# Patient Record
Sex: MALE | Race: OTHER
[De-identification: names, ages, dates, MRNs, and addresses within clinical notes are randomized per-mention and may not be internally consistent; named-entity substitution may affect disease eponyms.]

---

## 2017-06-13 ENCOUNTER — RECORD ABSTRACTING (OUTPATIENT)
Age: 62
End: 2017-06-13

## 2017-06-13 DIAGNOSIS — R79.89 OTHER SPECIFIED ABNORMAL FINDINGS OF BLOOD CHEMISTRY: ICD-10-CM

## 2017-06-13 DIAGNOSIS — R74.8 ABNORMAL LEVELS OF OTHER SERUM ENZYMES: ICD-10-CM

## 2017-06-13 DIAGNOSIS — Z87.898 PERSONAL HISTORY OF OTHER SPECIFIED CONDITIONS: ICD-10-CM

## 2017-06-13 DIAGNOSIS — Z00.8 ENCOUNTER FOR OTHER GENERAL EXAMINATION: ICD-10-CM

## 2017-06-13 DIAGNOSIS — F15.90 OTHER STIMULANT USE, UNSPECIFIED, UNCOMPLICATED: ICD-10-CM

## 2017-06-13 DIAGNOSIS — E66.3 OVERWEIGHT: ICD-10-CM

## 2017-06-13 DIAGNOSIS — Z87.891 PERSONAL HISTORY OF NICOTINE DEPENDENCE: ICD-10-CM

## 2017-06-13 DIAGNOSIS — Z86.39 PERSONAL HISTORY OF OTHER ENDOCRINE, NUTRITIONAL AND METABOLIC DISEASE: ICD-10-CM

## 2017-06-23 ENCOUNTER — APPOINTMENT (OUTPATIENT)
Dept: HEART AND VASCULAR | Facility: CLINIC | Age: 62
End: 2017-06-23

## 2017-06-23 VITALS
BODY MASS INDEX: 29.39 KG/M2 | OXYGEN SATURATION: 98 % | DIASTOLIC BLOOD PRESSURE: 84 MMHG | HEART RATE: 85 BPM | WEIGHT: 231.49 LBS | HEIGHT: 74.5 IN | SYSTOLIC BLOOD PRESSURE: 138 MMHG | TEMPERATURE: 99 F

## 2017-06-23 DIAGNOSIS — Z12.9 ENCOUNTER FOR SCREENING FOR MALIGNANT NEOPLASM, SITE UNSPECIFIED: ICD-10-CM

## 2017-06-23 RX ORDER — ESZOPICLONE 3 MG/1
3 TABLET, COATED ORAL
Refills: 0 | Status: ACTIVE | COMMUNITY

## 2017-06-26 LAB
ALBUMIN SERPL ELPH-MCNC: 4.6 G/DL
ALP BLD-CCNC: 50 U/L
ALT SERPL-CCNC: 13 U/L
ANION GAP SERPL CALC-SCNC: 16 MMOL/L
AST SERPL-CCNC: 23 U/L
BASOPHILS # BLD AUTO: 0.02 K/UL
BASOPHILS NFR BLD AUTO: 0.4 %
BILIRUB SERPL-MCNC: 0.5 MG/DL
BUN SERPL-MCNC: 10 MG/DL
CALCIUM SERPL-MCNC: 9.1 MG/DL
CHLORIDE SERPL-SCNC: 100 MMOL/L
CHOLEST SERPL-MCNC: 152 MG/DL
CHOLEST/HDLC SERPL: 3.2 RATIO
CO2 SERPL-SCNC: 25 MMOL/L
CREAT SERPL-MCNC: 0.91 MG/DL
EOSINOPHIL # BLD AUTO: 0.03 K/UL
EOSINOPHIL NFR BLD AUTO: 0.5 %
GLUCOSE SERPL-MCNC: 103 MG/DL
HBA1C MFR BLD HPLC: 5.5 %
HCT VFR BLD CALC: 47.3 %
HDLC SERPL-MCNC: 48 MG/DL
HGB BLD-MCNC: 15.5 G/DL
IMM GRANULOCYTES NFR BLD AUTO: 0 %
LDLC SERPL CALC-MCNC: 84 MG/DL
LYMPHOCYTES # BLD AUTO: 2.35 K/UL
LYMPHOCYTES NFR BLD AUTO: 42.4 %
MAN DIFF?: NORMAL
MCHC RBC-ENTMCNC: 32 PG
MCHC RBC-ENTMCNC: 32.8 GM/DL
MCV RBC AUTO: 97.7 FL
MONOCYTES # BLD AUTO: 0.44 K/UL
MONOCYTES NFR BLD AUTO: 7.9 %
NEUTROPHILS # BLD AUTO: 2.7 K/UL
NEUTROPHILS NFR BLD AUTO: 48.8 %
PLATELET # BLD AUTO: 194 K/UL
POTASSIUM SERPL-SCNC: 5.1 MMOL/L
PROT SERPL-MCNC: 7.5 G/DL
RBC # BLD: 4.84 M/UL
RBC # FLD: 13.3 %
SODIUM SERPL-SCNC: 141 MMOL/L
T3FREE SERPL-MCNC: 2.78 PG/ML
T4 FREE SERPL-MCNC: 1.2 NG/DL
TRIGL SERPL-MCNC: 98 MG/DL
TSH SERPL-ACNC: 1.59 UIU/ML
WBC # FLD AUTO: 5.54 K/UL

## 2017-11-06 ENCOUNTER — RX RENEWAL (OUTPATIENT)
Age: 62
End: 2017-11-06

## 2018-01-08 ENCOUNTER — APPOINTMENT (OUTPATIENT)
Dept: HEART AND VASCULAR | Facility: CLINIC | Age: 63
End: 2018-01-08
Payer: MEDICARE

## 2018-01-08 VITALS
BODY MASS INDEX: 31.02 KG/M2 | SYSTOLIC BLOOD PRESSURE: 132 MMHG | HEIGHT: 74.5 IN | TEMPERATURE: 98.7 F | OXYGEN SATURATION: 98 % | DIASTOLIC BLOOD PRESSURE: 80 MMHG | HEART RATE: 99 BPM | WEIGHT: 244.31 LBS

## 2018-01-08 PROCEDURE — 93000 ELECTROCARDIOGRAM COMPLETE: CPT

## 2018-01-08 PROCEDURE — 36415 COLL VENOUS BLD VENIPUNCTURE: CPT

## 2018-01-08 PROCEDURE — 99214 OFFICE O/P EST MOD 30 MIN: CPT | Mod: 25

## 2018-01-10 ENCOUNTER — APPOINTMENT (OUTPATIENT)
Dept: HEART AND VASCULAR | Facility: CLINIC | Age: 63
End: 2018-01-10
Payer: MEDICARE

## 2018-01-10 LAB
ALBUMIN SERPL ELPH-MCNC: 4.8 G/DL
ALP BLD-CCNC: 49 U/L
ALT SERPL-CCNC: 20 U/L
ANION GAP SERPL CALC-SCNC: 17 MMOL/L
AST SERPL-CCNC: 22 U/L
BASOPHILS # BLD AUTO: 0.02 K/UL
BASOPHILS NFR BLD AUTO: 0.3 %
BILIRUB SERPL-MCNC: 0.2 MG/DL
BUN SERPL-MCNC: 17 MG/DL
CALCIUM SERPL-MCNC: 9.6 MG/DL
CHLORIDE SERPL-SCNC: 100 MMOL/L
CHOLEST SERPL-MCNC: 175 MG/DL
CHOLEST/HDLC SERPL: 3.7 RATIO
CO2 SERPL-SCNC: 24 MMOL/L
CREAT SERPL-MCNC: 0.93 MG/DL
EOSINOPHIL # BLD AUTO: 0.07 K/UL
EOSINOPHIL NFR BLD AUTO: 1.1 %
GLUCOSE SERPL-MCNC: 115 MG/DL
HBA1C MFR BLD HPLC: 5.7 %
HCT VFR BLD CALC: 46.3 %
HCV AB SER QL: NONREACTIVE
HCV S/CO RATIO: 0.15 S/CO
HDLC SERPL-MCNC: 47 MG/DL
HGB BLD-MCNC: 15.9 G/DL
IMM GRANULOCYTES NFR BLD AUTO: 0 %
LDLC SERPL CALC-MCNC: 85 MG/DL
LYMPHOCYTES # BLD AUTO: 2.05 K/UL
LYMPHOCYTES NFR BLD AUTO: 33.6 %
MAN DIFF?: NORMAL
MCHC RBC-ENTMCNC: 33.3 PG
MCHC RBC-ENTMCNC: 34.3 GM/DL
MCV RBC AUTO: 96.9 FL
MONOCYTES # BLD AUTO: 0.71 K/UL
MONOCYTES NFR BLD AUTO: 11.6 %
NEUTROPHILS # BLD AUTO: 3.25 K/UL
NEUTROPHILS NFR BLD AUTO: 53.4 %
PLATELET # BLD AUTO: 187 K/UL
POTASSIUM SERPL-SCNC: 4.8 MMOL/L
PROT SERPL-MCNC: 7.4 G/DL
PSA SERPL-MCNC: 0.83 NG/ML
RBC # BLD: 4.78 M/UL
RBC # FLD: 12.9 %
SODIUM SERPL-SCNC: 141 MMOL/L
TRIGL SERPL-MCNC: 217 MG/DL
WBC # FLD AUTO: 6.1 K/UL

## 2018-01-10 PROCEDURE — ZZZZZ: CPT

## 2018-04-26 ENCOUNTER — RX RENEWAL (OUTPATIENT)
Age: 63
End: 2018-04-26

## 2018-05-01 ENCOUNTER — APPOINTMENT (OUTPATIENT)
Dept: HEART AND VASCULAR | Facility: CLINIC | Age: 63
End: 2018-05-01
Payer: MEDICARE

## 2018-05-01 VITALS
SYSTOLIC BLOOD PRESSURE: 140 MMHG | TEMPERATURE: 99 F | BODY MASS INDEX: 30.21 KG/M2 | HEART RATE: 96 BPM | OXYGEN SATURATION: 98 % | WEIGHT: 243 LBS | HEIGHT: 75 IN | DIASTOLIC BLOOD PRESSURE: 80 MMHG

## 2018-05-01 DIAGNOSIS — G40.909 EPILEPSY, UNSPECIFIED, NOT INTRACTABLE, W/OUT STATUS EPILEPTICUS: ICD-10-CM

## 2018-05-01 PROCEDURE — 99214 OFFICE O/P EST MOD 30 MIN: CPT

## 2018-07-10 ENCOUNTER — APPOINTMENT (OUTPATIENT)
Dept: HEART AND VASCULAR | Facility: CLINIC | Age: 63
End: 2018-07-10
Payer: MEDICARE

## 2018-07-10 ENCOUNTER — LABORATORY RESULT (OUTPATIENT)
Age: 63
End: 2018-07-10

## 2018-07-10 VITALS
TEMPERATURE: 97.9 F | BODY MASS INDEX: 30.46 KG/M2 | WEIGHT: 245 LBS | SYSTOLIC BLOOD PRESSURE: 120 MMHG | HEART RATE: 102 BPM | OXYGEN SATURATION: 97 % | HEIGHT: 75 IN | DIASTOLIC BLOOD PRESSURE: 80 MMHG

## 2018-07-10 DIAGNOSIS — Z12.9 ENCOUNTER FOR SCREENING FOR MALIGNANT NEOPLASM, SITE UNSPECIFIED: ICD-10-CM

## 2018-07-10 DIAGNOSIS — G47.00 INSOMNIA, UNSPECIFIED: ICD-10-CM

## 2018-07-10 DIAGNOSIS — Z87.09 PERSONAL HISTORY OF OTHER DISEASES OF THE RESPIRATORY SYSTEM: ICD-10-CM

## 2018-07-10 PROCEDURE — 36415 COLL VENOUS BLD VENIPUNCTURE: CPT

## 2018-07-10 PROCEDURE — 99214 OFFICE O/P EST MOD 30 MIN: CPT | Mod: 25

## 2018-07-10 PROCEDURE — 93000 ELECTROCARDIOGRAM COMPLETE: CPT

## 2018-07-11 LAB
ALBUMIN SERPL ELPH-MCNC: 4.7 G/DL
ALP BLD-CCNC: 50 U/L
ALT SERPL-CCNC: 22 U/L
ANION GAP SERPL CALC-SCNC: 14 MMOL/L
AST SERPL-CCNC: 23 U/L
BASOPHILS # BLD AUTO: 0.02 K/UL
BASOPHILS NFR BLD AUTO: 0.4 %
BILIRUB SERPL-MCNC: 0.3 MG/DL
BUN SERPL-MCNC: 16 MG/DL
CALCIUM SERPL-MCNC: 9.2 MG/DL
CHLORIDE SERPL-SCNC: 100 MMOL/L
CO2 SERPL-SCNC: 26 MMOL/L
CREAT SERPL-MCNC: 0.79 MG/DL
EOSINOPHIL # BLD AUTO: 0.07 K/UL
EOSINOPHIL NFR BLD AUTO: 1.4 %
GLUCOSE SERPL-MCNC: 104 MG/DL
HBA1C MFR BLD HPLC: 5.7 %
HCT VFR BLD CALC: 46.3 %
HGB BLD-MCNC: 15.5 G/DL
IMM GRANULOCYTES NFR BLD AUTO: 0.2 %
LYMPHOCYTES # BLD AUTO: 1.77 K/UL
LYMPHOCYTES NFR BLD AUTO: 34.4 %
MAN DIFF?: NORMAL
MCHC RBC-ENTMCNC: 31.8 PG
MCHC RBC-ENTMCNC: 33.5 GM/DL
MCV RBC AUTO: 94.9 FL
MONOCYTES # BLD AUTO: 0.66 K/UL
MONOCYTES NFR BLD AUTO: 12.8 %
NEUTROPHILS # BLD AUTO: 2.61 K/UL
NEUTROPHILS NFR BLD AUTO: 50.8 %
PLATELET # BLD AUTO: 179 K/UL
POTASSIUM SERPL-SCNC: 4.4 MMOL/L
PROT SERPL-MCNC: 7 G/DL
RBC # BLD: 4.88 M/UL
RBC # FLD: 13.1 %
SODIUM SERPL-SCNC: 139 MMOL/L
WBC # FLD AUTO: 5.14 K/UL

## 2018-07-22 PROBLEM — R79.89 LOW TESTOSTERONE: Status: RESOLVED | Noted: 2017-06-13 | Resolved: 2018-07-22

## 2018-11-28 ENCOUNTER — RX RENEWAL (OUTPATIENT)
Age: 63
End: 2018-11-28

## 2018-12-10 ENCOUNTER — APPOINTMENT (OUTPATIENT)
Dept: HEART AND VASCULAR | Facility: CLINIC | Age: 63
End: 2018-12-10
Payer: MEDICARE

## 2018-12-10 VITALS
HEART RATE: 115 BPM | SYSTOLIC BLOOD PRESSURE: 120 MMHG | TEMPERATURE: 97.3 F | BODY MASS INDEX: 28.47 KG/M2 | HEIGHT: 75 IN | WEIGHT: 228.99 LBS | DIASTOLIC BLOOD PRESSURE: 70 MMHG | OXYGEN SATURATION: 98 %

## 2018-12-10 PROCEDURE — 36415 COLL VENOUS BLD VENIPUNCTURE: CPT

## 2018-12-10 PROCEDURE — 93000 ELECTROCARDIOGRAM COMPLETE: CPT

## 2018-12-10 PROCEDURE — 99214 OFFICE O/P EST MOD 30 MIN: CPT

## 2018-12-10 NOTE — PHYSICAL EXAM
[General Appearance - Well Developed] : well developed [Normal Appearance] : normal appearance [Well Groomed] : well groomed [General Appearance - Well Nourished] : well nourished [No Deformities] : no deformities [General Appearance - In No Acute Distress] : no acute distress [Normal Conjunctiva] : the conjunctiva exhibited no abnormalities [Eyelids - No Xanthelasma] : the eyelids demonstrated no xanthelasmas [Normal Oral Mucosa] : normal oral mucosa [No Oral Pallor] : no oral pallor [No Oral Cyanosis] : no oral cyanosis [Normal Jugular Venous A Waves Present] : normal jugular venous A waves present [Normal Jugular Venous V Waves Present] : normal jugular venous V waves present [No Jugular Venous Santiago A Waves] : no jugular venous santiago A waves [Respiration, Rhythm And Depth] : normal respiratory rhythm and effort [Exaggerated Use Of Accessory Muscles For Inspiration] : no accessory muscle use [Auscultation Breath Sounds / Voice Sounds] : lungs were clear to auscultation bilaterally [Heart Rate And Rhythm] : heart rate and rhythm were normal [Heart Sounds] : normal S1 and S2 [Murmurs] : no murmurs present [Abdomen Soft] : soft [Abdomen Tenderness] : non-tender [Abdomen Mass (___ Cm)] : no abdominal mass palpated [Abnormal Walk] : normal gait [Gait - Sufficient For Exercise Testing] : the gait was sufficient for exercise testing [Nail Clubbing] : no clubbing of the fingernails [Cyanosis, Localized] : no localized cyanosis [Petechial Hemorrhages (___cm)] : no petechial hemorrhages [Skin Color & Pigmentation] : normal skin color and pigmentation [] : no rash [No Venous Stasis] : no venous stasis [Skin Lesions] : no skin lesions [No Skin Ulcers] : no skin ulcer [No Xanthoma] : no  xanthoma was observed [Oriented To Time, Place, And Person] : oriented to person, place, and time [Mood] : the mood was normal [FreeTextEntry1] : pressured speech

## 2018-12-10 NOTE — ASSESSMENT
[FreeTextEntry1] : Fatigue-not a new c/o, will check labs\par \par AC- complete exam including JONG and labs done.  EKG NL.  PPD placed right forearm in 1/2018 negative\par \par BPH-Cialis RENEWED, Pt needs a non-formulary exception(BPH, failed other meds, Avodart, Flomax, Uroxatrol).  Did see Urology Dr Berger.[facility] Rx [employer] MemberID [id number], RxBin [id number], RxPCN 9999, RxGrp [id number], Providers [phone]\par \par Prediabetes-pt has lost weight, exercising.  Refuses flu shot 12/10/18 BECAUSE HIS SISTER TOLD HIM IT WAS DANGEROUS.\par \par Schizoaffective disorder-Pt disagrees with this diagnosis. Pt says he has insomnia and anx/depr.   Dr Borden. Over 30 min spent with pt.\par CANCER SCREENING-never had colonoscopy, he agrees to it, will order. Pt did not have it yet. JONG and PSA done 1/8/18, colonoscopy discussed again 12/10/18!!.  \par \par

## 2018-12-10 NOTE — REASON FOR VISIT
[Follow-Up - Clinic] : a clinic follow-up of [FreeTextEntry1] : PT here for f/u of multiple medical problems. h/o elevated LFTs, prediabetes, BPH, review of cancer screening, etc  He denies that he has Schizoaffective disorder. \par Pt takes Cialis, has tried Tadafinil but results were not as good.  Has BPH and ED from meds.  Saw a Urologist @ [facility] and [facility], as per pt he was told to go to 10mg.  Pt reports mild incontinence, some nocturia.\par \par \par EKG: NSR, normal axis and intervals, no ST-Tw abnormalities. 12/10/18\par \par Refuses flu shot

## 2018-12-11 LAB
ALBUMIN SERPL ELPH-MCNC: 4.9 G/DL
ALP BLD-CCNC: 56 U/L
ALT SERPL-CCNC: 17 U/L
ANION GAP SERPL CALC-SCNC: 15 MMOL/L
AST SERPL-CCNC: 22 U/L
BASOPHILS # BLD AUTO: 0.02 K/UL
BASOPHILS NFR BLD AUTO: 0.3 %
BILIRUB SERPL-MCNC: 0.2 MG/DL
BUN SERPL-MCNC: 13 MG/DL
CALCIUM SERPL-MCNC: 9.8 MG/DL
CHLORIDE SERPL-SCNC: 100 MMOL/L
CHOLEST SERPL-MCNC: 159 MG/DL
CHOLEST/HDLC SERPL: 4 RATIO
CO2 SERPL-SCNC: 24 MMOL/L
CREAT SERPL-MCNC: 0.86 MG/DL
EOSINOPHIL # BLD AUTO: 0.05 K/UL
EOSINOPHIL NFR BLD AUTO: 0.9 %
GLUCOSE SERPL-MCNC: 118 MG/DL
HBA1C MFR BLD HPLC: 5.8 %
HCT VFR BLD CALC: 48.7 %
HDLC SERPL-MCNC: 40 MG/DL
HGB BLD-MCNC: 15.9 G/DL
IMM GRANULOCYTES NFR BLD AUTO: 0.2 %
LDLC SERPL CALC-MCNC: 82 MG/DL
LYMPHOCYTES # BLD AUTO: 1.36 K/UL
LYMPHOCYTES NFR BLD AUTO: 23.2 %
MAN DIFF?: NORMAL
MCHC RBC-ENTMCNC: 32.1 PG
MCHC RBC-ENTMCNC: 32.6 GM/DL
MCV RBC AUTO: 98.2 FL
MONOCYTES # BLD AUTO: 0.48 K/UL
MONOCYTES NFR BLD AUTO: 8.2 %
NEUTROPHILS # BLD AUTO: 3.93 K/UL
NEUTROPHILS NFR BLD AUTO: 67.2 %
PLATELET # BLD AUTO: 203 K/UL
POTASSIUM SERPL-SCNC: 4.8 MMOL/L
PROLACTIN SERPL-MCNC: 4 NG/ML
PROT SERPL-MCNC: 7.1 G/DL
RBC # BLD: 4.96 M/UL
RBC # FLD: 13.4 %
SODIUM SERPL-SCNC: 139 MMOL/L
TRIGL SERPL-MCNC: 183 MG/DL
VALPROATE SERPL-MCNC: 54 UG/ML
WBC # FLD AUTO: 5.85 K/UL

## 2019-01-08 ENCOUNTER — APPOINTMENT (OUTPATIENT)
Dept: HEART AND VASCULAR | Facility: CLINIC | Age: 64
End: 2019-01-08
Payer: MEDICARE

## 2019-01-08 VITALS
HEIGHT: 75 IN | TEMPERATURE: 98.3 F | OXYGEN SATURATION: 98 % | DIASTOLIC BLOOD PRESSURE: 88 MMHG | WEIGHT: 231 LBS | SYSTOLIC BLOOD PRESSURE: 140 MMHG | HEART RATE: 94 BPM | BODY MASS INDEX: 28.72 KG/M2

## 2019-01-08 PROCEDURE — 99214 OFFICE O/P EST MOD 30 MIN: CPT

## 2019-01-08 NOTE — ASSESSMENT
[FreeTextEntry1] : Fatigue-not a new c/o, will check labs\par \par HCM- complete exam including JONG and labs done.  EKG NL.  PPD placed right forearm in 1/2018 negative\par \par BPH-Cialis RENEWED, Pt needs a non-formulary exception(BPH, failed other meds, Avodart, Flomax, Uroxatrol).  Did see Urology Dr Berger.[facility] Rx [employer] MemberID [id number], RxBin [id number], RxPCN 9999, RxGrp [id number], Providers [phone]\par \par HTN- will follow BP, is losing weight.\par \par Prediabetes-pt has lost weight, exercising.  Refuses flu shot 12/10/18 BECAUSE HIS SISTER TOLD HIM IT WAS DANGEROUS.  Losing weight.\par \par Schizoaffective disorder-Pt disagrees with this diagnosis. Pt says he has insomnia and anx/depr.   Dr Borden. Over 30 min spent with pt.\par CANCER SCREENING-never had colonoscopy, he agrees to it, will order. Pt did not have it yet. JONG and PSA done 1/8/18, colonoscopy discussed again 12/10/18!!.  \par \par

## 2019-01-08 NOTE — REASON FOR VISIT
[Follow-Up - Clinic] : a clinic follow-up of [FreeTextEntry1] : PT here for f/u of multiple medical problems. h/o elevated LFTs, prediabetes, BPH, review of cancer screening, etc  He denies that he has Schizoaffective disorder. \par Pt takes Cialis, has tried Tadafinil but results were not as good.  Has BPH and ED from meds.  Saw a Urologist @ [facility] and [facility], as per pt he was told to go to 10mg.  Pt reports mild incontinence, some nocturia.\par \par \par EKG: NSR, normal axis and intervals, no ST-Tw abnormalities. 12/10/18\par \par Refuses flu shot, pt here 1/8/19 insisting I call his plan to get Cialis 5mg, i did this but was disconnected after 10min.

## 2019-04-09 ENCOUNTER — APPOINTMENT (OUTPATIENT)
Dept: HEART AND VASCULAR | Facility: CLINIC | Age: 64
End: 2019-04-09
Payer: MEDICARE

## 2019-04-09 VITALS
WEIGHT: 228 LBS | BODY MASS INDEX: 28.35 KG/M2 | RESPIRATION RATE: 14 BRPM | OXYGEN SATURATION: 97 % | HEART RATE: 95 BPM | TEMPERATURE: 97.7 F | HEIGHT: 75 IN | DIASTOLIC BLOOD PRESSURE: 82 MMHG | SYSTOLIC BLOOD PRESSURE: 121 MMHG

## 2019-04-09 PROCEDURE — 36415 COLL VENOUS BLD VENIPUNCTURE: CPT

## 2019-04-09 PROCEDURE — 93000 ELECTROCARDIOGRAM COMPLETE: CPT

## 2019-04-09 PROCEDURE — 99214 OFFICE O/P EST MOD 30 MIN: CPT

## 2019-04-09 NOTE — REASON FOR VISIT
[Follow-Up - Clinic] : a clinic follow-up of [FreeTextEntry1] : PT here for f/u of multiple medical problems. h/o elevated LFTs, prediabetes, BPH, review of cancer screening, etc  He denies that he has Schizoaffective disorder. \par Pt takes Cialis, has tried Tadafinil but results were not as good.  Has BPH and ED from meds.  Saw a Urologist @ [facility] and [facility], as per pt he was told to go to 10mg.  Pt reports mild incontinence, some nocturia.\par \par \par EKG: NSR, normal axis and intervals, no ST-Tw abnormalities.4/9/19\par \par Refuses flu shot, pt here 1/8/19 insisting I call his plan to get Cialis 5mg, i did this but was disconnected after 10min.

## 2019-04-09 NOTE — PHYSICAL EXAM
[General Appearance - Well Developed] : well developed [Normal Appearance] : normal appearance [Well Groomed] : well groomed [General Appearance - Well Nourished] : well nourished [No Deformities] : no deformities [General Appearance - In No Acute Distress] : no acute distress [Normal Conjunctiva] : the conjunctiva exhibited no abnormalities [Eyelids - No Xanthelasma] : the eyelids demonstrated no xanthelasmas [Normal Oral Mucosa] : normal oral mucosa [No Oral Cyanosis] : no oral cyanosis [No Oral Pallor] : no oral pallor [Normal Jugular Venous A Waves Present] : normal jugular venous A waves present [Normal Jugular Venous V Waves Present] : normal jugular venous V waves present [No Jugular Venous Santiago A Waves] : no jugular venous santiago A waves [Exaggerated Use Of Accessory Muscles For Inspiration] : no accessory muscle use [Respiration, Rhythm And Depth] : normal respiratory rhythm and effort [Heart Rate And Rhythm] : heart rate and rhythm were normal [Auscultation Breath Sounds / Voice Sounds] : lungs were clear to auscultation bilaterally [Heart Sounds] : normal S1 and S2 [Murmurs] : no murmurs present [Abdomen Tenderness] : non-tender [Abdomen Soft] : soft [Abnormal Walk] : normal gait [Abdomen Mass (___ Cm)] : no abdominal mass palpated [Gait - Sufficient For Exercise Testing] : the gait was sufficient for exercise testing [Nail Clubbing] : no clubbing of the fingernails [Cyanosis, Localized] : no localized cyanosis [Petechial Hemorrhages (___cm)] : no petechial hemorrhages [Skin Color & Pigmentation] : normal skin color and pigmentation [] : no rash [No Venous Stasis] : no venous stasis [Skin Lesions] : no skin lesions [No Xanthoma] : no  xanthoma was observed [No Skin Ulcers] : no skin ulcer [Oriented To Time, Place, And Person] : oriented to person, place, and time [Mood] : the mood was normal [FreeTextEntry1] : pressured speech

## 2019-04-09 NOTE — ASSESSMENT
[FreeTextEntry1] : Fatigue-not a new c/o, will update  labs \par \par Health Maintainence- complete exam including JONG and labs done.  EKG NL.  PPD placed right forearm in 1/2018 negative\par \par BPH-Cialis RENEWED, Pt needs a non-formulary exception(BPH, failed other meds, Avodart, Flomax, Uroxatrol).  Did see Urology Dr Berger.[facility] Rx [employer] MemberID [id number], RxBin [id number], RxPCN 9999, RxGrp [id number], Providers [phone]\par \par HTN- will follow BP, is losing weight.\par \par Prediabetes-pt has lost weight, exercising.  Refuses flu shot 12/10/18 BECAUSE HIS SISTER TOLD HIM IT WAS DANGEROUS.  Losing weight nicely..\par \par Schizoaffective disorder-Pt disagrees with this diagnosis. Pt says he has insomnia and anx/depr.   Dr Borden. Over 30 min spent with pt.\par \par CANCER SCREENING-never had colonoscopy, he agrees to it, will order. Pt did not have it yet. JONG and PSA done 1/8/18, colonoscopy discussed again 12/10/18!!.  4/9/19.\par \par 
independent

## 2019-04-10 LAB
24R-OH-CALCIDIOL SERPL-MCNC: 33.6 PG/ML
ALBUMIN SERPL ELPH-MCNC: 5.2 G/DL
ALP BLD-CCNC: 52 U/L
ALT SERPL-CCNC: 21 U/L
ANION GAP SERPL CALC-SCNC: 15 MMOL/L
AST SERPL-CCNC: 25 U/L
BASOPHILS # BLD AUTO: 0.03 K/UL
BASOPHILS NFR BLD AUTO: 0.5 %
BILIRUB SERPL-MCNC: 0.4 MG/DL
BUN SERPL-MCNC: 13 MG/DL
CALCIUM SERPL-MCNC: 9.7 MG/DL
CHLORIDE SERPL-SCNC: 100 MMOL/L
CHOLEST SERPL-MCNC: 171 MG/DL
CHOLEST/HDLC SERPL: 4 RATIO
CO2 SERPL-SCNC: 26 MMOL/L
CREAT SERPL-MCNC: 0.87 MG/DL
EOSINOPHIL # BLD AUTO: 0.07 K/UL
EOSINOPHIL NFR BLD AUTO: 1.2 %
ESTIMATED AVERAGE GLUCOSE: 114 MG/DL
GLUCOSE SERPL-MCNC: 111 MG/DL
HBA1C MFR BLD HPLC: 5.6 %
HCT VFR BLD CALC: 50.3 %
HDLC SERPL-MCNC: 43 MG/DL
HGB BLD-MCNC: 17 G/DL
IMM GRANULOCYTES NFR BLD AUTO: 0.2 %
LDLC SERPL CALC-MCNC: 99 MG/DL
LYMPHOCYTES # BLD AUTO: 2.07 K/UL
LYMPHOCYTES NFR BLD AUTO: 36.3 %
MAN DIFF?: NORMAL
MCHC RBC-ENTMCNC: 32.3 PG
MCHC RBC-ENTMCNC: 33.8 GM/DL
MCV RBC AUTO: 95.4 FL
MONOCYTES # BLD AUTO: 0.54 K/UL
MONOCYTES NFR BLD AUTO: 9.5 %
NEUTROPHILS # BLD AUTO: 2.98 K/UL
NEUTROPHILS NFR BLD AUTO: 52.3 %
PLATELET # BLD AUTO: 189 K/UL
POTASSIUM SERPL-SCNC: 4.6 MMOL/L
PROLACTIN SERPL-MCNC: 4.8 NG/ML
PROT SERPL-MCNC: 7.6 G/DL
PSA SERPL-MCNC: 1.18 NG/ML
RBC # BLD: 5.27 M/UL
RBC # FLD: 12.7 %
SODIUM SERPL-SCNC: 141 MMOL/L
TESTOST SERPL-MCNC: 331 NG/DL
TRIGL SERPL-MCNC: 145 MG/DL
VALPROATE SERPL-MCNC: 69 UG/ML
WBC # FLD AUTO: 5.7 K/UL

## 2019-09-09 ENCOUNTER — APPOINTMENT (OUTPATIENT)
Dept: HEART AND VASCULAR | Facility: CLINIC | Age: 64
End: 2019-09-09
Payer: MEDICARE

## 2019-09-09 VITALS
HEART RATE: 99 BPM | OXYGEN SATURATION: 98 % | WEIGHT: 228.99 LBS | DIASTOLIC BLOOD PRESSURE: 80 MMHG | HEIGHT: 75 IN | TEMPERATURE: 98.5 F | SYSTOLIC BLOOD PRESSURE: 122 MMHG | BODY MASS INDEX: 28.47 KG/M2

## 2019-09-09 DIAGNOSIS — Z83.3 FAMILY HISTORY OF DIABETES MELLITUS: ICD-10-CM

## 2019-09-09 PROCEDURE — 99214 OFFICE O/P EST MOD 30 MIN: CPT

## 2019-09-09 PROCEDURE — 36415 COLL VENOUS BLD VENIPUNCTURE: CPT

## 2019-09-09 NOTE — PHYSICAL EXAM
[General Appearance - Well Developed] : well developed [Normal Appearance] : normal appearance [Well Groomed] : well groomed [No Deformities] : no deformities [General Appearance - Well Nourished] : well nourished [General Appearance - In No Acute Distress] : no acute distress [Normal Conjunctiva] : the conjunctiva exhibited no abnormalities [Eyelids - No Xanthelasma] : the eyelids demonstrated no xanthelasmas [Normal Oral Mucosa] : normal oral mucosa [No Oral Pallor] : no oral pallor [No Oral Cyanosis] : no oral cyanosis [Normal Jugular Venous A Waves Present] : normal jugular venous A waves present [Normal Jugular Venous V Waves Present] : normal jugular venous V waves present [No Jugular Venous Santiago A Waves] : no jugular venous santiago A waves [Respiration, Rhythm And Depth] : normal respiratory rhythm and effort [Auscultation Breath Sounds / Voice Sounds] : lungs were clear to auscultation bilaterally [Exaggerated Use Of Accessory Muscles For Inspiration] : no accessory muscle use [Heart Rate And Rhythm] : heart rate and rhythm were normal [Heart Sounds] : normal S1 and S2 [Abdomen Soft] : soft [Murmurs] : no murmurs present [Abdomen Tenderness] : non-tender [Abdomen Mass (___ Cm)] : no abdominal mass palpated [Abnormal Walk] : normal gait [Gait - Sufficient For Exercise Testing] : the gait was sufficient for exercise testing [Nail Clubbing] : no clubbing of the fingernails [Cyanosis, Localized] : no localized cyanosis [Petechial Hemorrhages (___cm)] : no petechial hemorrhages [Skin Color & Pigmentation] : normal skin color and pigmentation [] : no rash [No Venous Stasis] : no venous stasis [Skin Lesions] : no skin lesions [No Skin Ulcers] : no skin ulcer [No Xanthoma] : no  xanthoma was observed [Oriented To Time, Place, And Person] : oriented to person, place, and time [Mood] : the mood was normal [FreeTextEntry1] : pressured speech

## 2019-09-09 NOTE — ASSESSMENT
Last appt - 6/26/2018    Future Appointments  Date Time Provider Tawanna Sawyer   9/27/2018 11:30 AM DO TAVON Carlos [FreeTextEntry1] : Fatigue-not a new c/o, will update  labs \par \par Health Maintainence- complete exam including JONG and labs done.  EKG NL.  PPD placed right forearm in 1/2018 negative\par \par BPH-Cialis RENEWED, Pt needs a non-formulary exception(BPH, failed other meds, Avodart, Flomax, Uroxatrol).  Did see Urology Dr Berger.[facility] Rx [employer] MemberID [id number], RxBin [id number], RxPCN 9999, RxGrp PDPLCE1, Providers [phone]\par \par HTN- will follow BP, is losing weight.\par \par Prediabetes-pt has lost weight, exercising.  Refuses flu shot 12/10/18 BECAUSE HIS SISTER TOLD HIM IT WAS DANGEROUS.  Losing weight nicely..\par \par Schizoaffective disorder-Pt disagrees with this diagnosis. Pt says he has insomnia and anx/depr.   Dr Borden. Over 30 min spent with pt.\par \par CANCER SCREENING-never had colonoscopy, he agrees to it, will order. Pt did not have it yet. JONG and PSA done 1/8/18, colonoscopy discussed again 12/10/18!!.  4/9/19.\par \par DJD- knees, 2 prior surgeries.

## 2019-09-09 NOTE — REVIEW OF SYSTEMS
[Urinary Frequency] : urinary frequency [Impotence] : impotence [Negative] : Heme/Lymph [Joint Pain] : joint pain

## 2019-09-09 NOTE — REASON FOR VISIT
[Follow-Up - Clinic] : a clinic follow-up of [FreeTextEntry1] : PT here for f/u of multiple medical problems. h/o elevated LFTs, prediabetes, BPH, review of cancer screening, etc  He denies that he has Schizoaffective disorder. \par Pt takes Cialis, has tried Tadafinil but results were not as good.  Has BPH and ED from meds.  Saw a Urologist @ [facility] and [facility], as per pt he was told to go to 10mg.  Pt reports mild incontinence, some nocturia.\par \par \par EKG: NSR, normal axis and intervals, no ST-Tw abnormalities.4/9/19\par \par Refuses flu shot, pt here 1/8/19 insisting I call his plan to get Cialis 5mg, i did this but was disconnected after 10min.

## 2019-09-10 LAB
ALBUMIN SERPL ELPH-MCNC: 4.9 G/DL
ALP BLD-CCNC: 51 U/L
ALT SERPL-CCNC: 16 U/L
ANION GAP SERPL CALC-SCNC: 15 MMOL/L
AST SERPL-CCNC: 20 U/L
BASOPHILS # BLD AUTO: 0.03 K/UL
BASOPHILS NFR BLD AUTO: 0.6 %
BILIRUB SERPL-MCNC: 0.2 MG/DL
BUN SERPL-MCNC: 14 MG/DL
CALCIUM SERPL-MCNC: 9.4 MG/DL
CHLORIDE SERPL-SCNC: 100 MMOL/L
CHOLEST SERPL-MCNC: 139 MG/DL
CHOLEST/HDLC SERPL: 3.2 RATIO
CO2 SERPL-SCNC: 23 MMOL/L
CREAT SERPL-MCNC: 0.72 MG/DL
EOSINOPHIL # BLD AUTO: 0.06 K/UL
EOSINOPHIL NFR BLD AUTO: 1.2 %
ESTIMATED AVERAGE GLUCOSE: 114 MG/DL
GLUCOSE SERPL-MCNC: 107 MG/DL
HBA1C MFR BLD HPLC: 5.6 %
HCT VFR BLD CALC: 47.2 %
HDLC SERPL-MCNC: 43 MG/DL
HGB BLD-MCNC: 15.4 G/DL
IMM GRANULOCYTES NFR BLD AUTO: 0.4 %
LDLC SERPL CALC-MCNC: 78 MG/DL
LYMPHOCYTES # BLD AUTO: 1.62 K/UL
LYMPHOCYTES NFR BLD AUTO: 31.6 %
MAN DIFF?: NORMAL
MCHC RBC-ENTMCNC: 31.8 PG
MCHC RBC-ENTMCNC: 32.6 GM/DL
MCV RBC AUTO: 97.3 FL
MONOCYTES # BLD AUTO: 0.42 K/UL
MONOCYTES NFR BLD AUTO: 8.2 %
NEUTROPHILS # BLD AUTO: 2.98 K/UL
NEUTROPHILS NFR BLD AUTO: 58 %
PLATELET # BLD AUTO: 174 K/UL
POTASSIUM SERPL-SCNC: 4.6 MMOL/L
PROLACTIN SERPL-MCNC: 4.3 NG/ML
PROT SERPL-MCNC: 7.1 G/DL
RBC # BLD: 4.85 M/UL
RBC # FLD: 12.5 %
SODIUM SERPL-SCNC: 138 MMOL/L
TRIGL SERPL-MCNC: 92 MG/DL
VALPROATE SERPL-MCNC: 81 UG/ML
WBC # FLD AUTO: 5.13 K/UL

## 2019-12-17 ENCOUNTER — APPOINTMENT (OUTPATIENT)
Dept: HEART AND VASCULAR | Facility: CLINIC | Age: 64
End: 2019-12-17
Payer: MEDICARE

## 2019-12-17 ENCOUNTER — NON-APPOINTMENT (OUTPATIENT)
Age: 64
End: 2019-12-17

## 2019-12-17 VITALS
HEART RATE: 89 BPM | WEIGHT: 230 LBS | HEIGHT: 75 IN | OXYGEN SATURATION: 98 % | DIASTOLIC BLOOD PRESSURE: 76 MMHG | SYSTOLIC BLOOD PRESSURE: 124 MMHG | BODY MASS INDEX: 28.6 KG/M2

## 2019-12-17 PROCEDURE — 99214 OFFICE O/P EST MOD 30 MIN: CPT

## 2019-12-17 PROCEDURE — 36415 COLL VENOUS BLD VENIPUNCTURE: CPT

## 2019-12-17 NOTE — REASON FOR VISIT
[Follow-Up - Clinic] : a clinic follow-up of [FreeTextEntry1] : PT here for f/u of multiple medical problems. h/o elevated LFTs, prediabetes, BPH, review of cancer screening, etc  He denies that he has Schizoaffective disorder. \par Pt takes Cialis, has tried Tadafinil but results were not as good.  Has BPH and ED from meds.  Saw a Urologist @ [facility] and [facility], as per pt he was told to go to 10mg.  Pt reports mild incontinence, some nocturia.\par \par \par EKG: NSR, normal axis and intervals, no ST-Tw abnormalities.4/9/19\par \par Refuses flu shot, pt here 1/8/19 insisting I call his plan to get Cialis 5mg, i did this but was disconnected after 10min. \par 12/17/19 Dx with major depression by Dr Borden

## 2019-12-17 NOTE — REVIEW OF SYSTEMS
[Impotence] : impotence [Urinary Frequency] : urinary frequency [Joint Pain] : joint pain [Negative] : Heme/Lymph

## 2019-12-17 NOTE — PHYSICAL EXAM
[General Appearance - Well Developed] : well developed [Normal Appearance] : normal appearance [General Appearance - Well Nourished] : well nourished [Well Groomed] : well groomed [No Deformities] : no deformities [General Appearance - In No Acute Distress] : no acute distress [Normal Conjunctiva] : the conjunctiva exhibited no abnormalities [Normal Oral Mucosa] : normal oral mucosa [Eyelids - No Xanthelasma] : the eyelids demonstrated no xanthelasmas [No Oral Cyanosis] : no oral cyanosis [No Oral Pallor] : no oral pallor [Normal Jugular Venous A Waves Present] : normal jugular venous A waves present [Normal Jugular Venous V Waves Present] : normal jugular venous V waves present [No Jugular Venous Santiago A Waves] : no jugular venous santiago A waves [Exaggerated Use Of Accessory Muscles For Inspiration] : no accessory muscle use [Respiration, Rhythm And Depth] : normal respiratory rhythm and effort [Heart Sounds] : normal S1 and S2 [Auscultation Breath Sounds / Voice Sounds] : lungs were clear to auscultation bilaterally [Heart Rate And Rhythm] : heart rate and rhythm were normal [Murmurs] : no murmurs present [Abdomen Soft] : soft [Abdomen Tenderness] : non-tender [Abdomen Mass (___ Cm)] : no abdominal mass palpated [Abnormal Walk] : normal gait [Gait - Sufficient For Exercise Testing] : the gait was sufficient for exercise testing [Nail Clubbing] : no clubbing of the fingernails [Cyanosis, Localized] : no localized cyanosis [Petechial Hemorrhages (___cm)] : no petechial hemorrhages [Skin Color & Pigmentation] : normal skin color and pigmentation [] : no rash [No Venous Stasis] : no venous stasis [Skin Lesions] : no skin lesions [No Xanthoma] : no  xanthoma was observed [No Skin Ulcers] : no skin ulcer [Mood] : the mood was normal [Oriented To Time, Place, And Person] : oriented to person, place, and time [FreeTextEntry1] : pressured speech

## 2019-12-17 NOTE — ASSESSMENT
[FreeTextEntry1] : Fatigue-not a new c/o, previously updated labs\par \par Health Maintainence- complete exam including JONG and labs done 1/8/18.  EKG NL.  PPD placed right forearm in 1/2018 negative. Labs updated 12/17/19\par \par BPH-Cialis(Brand) 5 mg RENEWED, Pt needs a non-formulary exception(BPH, failed other meds, Avodart, Flomax, Uroxatrol).  Did see Urology Dr Berger.[facility] Rx [employer] MemberID [id number], RxBin [id number], RxPCN 9999, RxGrp PDPLCE1, Providers [phone]\par \par HTN- will follow BP, is losing weight.\par \par Prediabetes- pt has lost weight, exercising.  Refuses flu shot 12/10/18 BECAUSE HIS SISTER TOLD HIM IT WAS DANGEROUS.  Losing weight nicely. Recent slight gain.\par \par Schizoaffective disorder- Pt disagrees with this diagnosis. Pt says he has insomnia and anx/depr.   Dr Borden. Over 30 min spent with pt.\par \par CANCER SCREENING-never had colonoscopy, he agrees to it, will order. Pt did not have it yet. JONG and PSA done 1/8/18, colonoscopy discussed again 12/10/18!!.  4/9/19.\par \par DJD- knees, 2 prior surgeries. Also has back pain.  Having trouble with ADLs, says he needs help with cleaning and shopping so M11Q filled out.

## 2019-12-18 LAB
ALBUMIN SERPL ELPH-MCNC: 4.9 G/DL
ALP BLD-CCNC: 46 U/L
ALT SERPL-CCNC: 14 U/L
ANION GAP SERPL CALC-SCNC: 12 MMOL/L
AST SERPL-CCNC: 19 U/L
BASOPHILS # BLD AUTO: 0.04 K/UL
BASOPHILS NFR BLD AUTO: 0.9 %
BILIRUB SERPL-MCNC: 0.3 MG/DL
BUN SERPL-MCNC: 18 MG/DL
CALCIUM SERPL-MCNC: 9.7 MG/DL
CHLORIDE SERPL-SCNC: 101 MMOL/L
CHOLEST SERPL-MCNC: 170 MG/DL
CHOLEST/HDLC SERPL: 3.6 RATIO
CO2 SERPL-SCNC: 27 MMOL/L
CREAT SERPL-MCNC: 0.8 MG/DL
EOSINOPHIL # BLD AUTO: 0.08 K/UL
EOSINOPHIL NFR BLD AUTO: 1.7 %
ESTIMATED AVERAGE GLUCOSE: 114 MG/DL
GLUCOSE SERPL-MCNC: 116 MG/DL
HBA1C MFR BLD HPLC: 5.6 %
HCT VFR BLD CALC: 48 %
HDLC SERPL-MCNC: 47 MG/DL
HGB BLD-MCNC: 15.8 G/DL
IMM GRANULOCYTES NFR BLD AUTO: 0.2 %
LDLC SERPL CALC-MCNC: 110 MG/DL
LYMPHOCYTES # BLD AUTO: 1.69 K/UL
LYMPHOCYTES NFR BLD AUTO: 36.8 %
MAN DIFF?: NORMAL
MCHC RBC-ENTMCNC: 31.8 PG
MCHC RBC-ENTMCNC: 32.9 GM/DL
MCV RBC AUTO: 96.6 FL
MONOCYTES # BLD AUTO: 0.43 K/UL
MONOCYTES NFR BLD AUTO: 9.4 %
NEUTROPHILS # BLD AUTO: 2.34 K/UL
NEUTROPHILS NFR BLD AUTO: 51 %
PLATELET # BLD AUTO: 188 K/UL
POTASSIUM SERPL-SCNC: 4.7 MMOL/L
PROLACTIN SERPL-MCNC: 4.4 NG/ML
PROT SERPL-MCNC: 7.3 G/DL
RBC # BLD: 4.97 M/UL
RBC # FLD: 12.8 %
SODIUM SERPL-SCNC: 140 MMOL/L
TRIGL SERPL-MCNC: 63 MG/DL
VALPROATE SERPL-MCNC: 69 UG/ML
WBC # FLD AUTO: 4.59 K/UL

## 2020-05-28 ENCOUNTER — NON-APPOINTMENT (OUTPATIENT)
Age: 65
End: 2020-05-28

## 2020-05-28 ENCOUNTER — APPOINTMENT (OUTPATIENT)
Dept: HEART AND VASCULAR | Facility: CLINIC | Age: 65
End: 2020-05-28
Payer: MEDICARE

## 2020-05-28 VITALS
DIASTOLIC BLOOD PRESSURE: 90 MMHG | BODY MASS INDEX: 28.6 KG/M2 | OXYGEN SATURATION: 98 % | WEIGHT: 229.99 LBS | HEART RATE: 75 BPM | SYSTOLIC BLOOD PRESSURE: 120 MMHG | HEIGHT: 75 IN | TEMPERATURE: 98 F

## 2020-05-28 PROCEDURE — 99214 OFFICE O/P EST MOD 30 MIN: CPT

## 2020-05-28 PROCEDURE — 93000 ELECTROCARDIOGRAM COMPLETE: CPT

## 2020-05-28 PROCEDURE — 36415 COLL VENOUS BLD VENIPUNCTURE: CPT

## 2020-05-28 NOTE — PHYSICAL EXAM
[General Appearance - Well Developed] : well developed [Well Groomed] : well groomed [Normal Appearance] : normal appearance [No Deformities] : no deformities [General Appearance - In No Acute Distress] : no acute distress [General Appearance - Well Nourished] : well nourished [Normal Conjunctiva] : the conjunctiva exhibited no abnormalities [Normal Oral Mucosa] : normal oral mucosa [Eyelids - No Xanthelasma] : the eyelids demonstrated no xanthelasmas [No Oral Cyanosis] : no oral cyanosis [No Oral Pallor] : no oral pallor [Normal Jugular Venous V Waves Present] : normal jugular venous V waves present [Normal Jugular Venous A Waves Present] : normal jugular venous A waves present [Respiration, Rhythm And Depth] : normal respiratory rhythm and effort [No Jugular Venous Santiago A Waves] : no jugular venous santiago A waves [Exaggerated Use Of Accessory Muscles For Inspiration] : no accessory muscle use [Auscultation Breath Sounds / Voice Sounds] : lungs were clear to auscultation bilaterally [Heart Rate And Rhythm] : heart rate and rhythm were normal [Heart Sounds] : normal S1 and S2 [Abdomen Soft] : soft [Murmurs] : no murmurs present [Abdomen Tenderness] : non-tender [Abnormal Walk] : normal gait [Abdomen Mass (___ Cm)] : no abdominal mass palpated [Gait - Sufficient For Exercise Testing] : the gait was sufficient for exercise testing [Nail Clubbing] : no clubbing of the fingernails [Petechial Hemorrhages (___cm)] : no petechial hemorrhages [Cyanosis, Localized] : no localized cyanosis [Skin Color & Pigmentation] : normal skin color and pigmentation [] : no ischemic changes [No Venous Stasis] : no venous stasis [No Xanthoma] : no  xanthoma was observed [No Skin Ulcers] : no skin ulcer [Skin Lesions] : no skin lesions [Mood] : the mood was normal [Oriented To Time, Place, And Person] : oriented to person, place, and time [FreeTextEntry1] : pressured speech

## 2020-05-28 NOTE — ASSESSMENT
[FreeTextEntry1] : Health Maintainence- complete exam including JONG and labs done 1/8/18.  EKG NL.  PPD placed right forearm in 1/2018 negative. Labs updated 5/28/20\par \par BPH-Cialis(Brand) 5 mg RENEWED, Pt needs a non-formulary exception(BPH, failed other meds, Avodart, Flomax, Doxazosin(before me),  Terazosin(before me)Uroxatrol).  Did see Urology Dr Berger.[facility] Rx [employer] MemberID [id number], RxBin [id number], RxPCN 9999, RxGrp PDPLCE1, Providers [phone]\par \par HTN- will follow BP, is losing weight.\par \par Prediabetes- pt has lost weight, exercising.  Refuses flu shot 12/10/18 BECAUSE HIS SISTER TOLD HIM IT WAS DANGEROUS.  Losing weight nicely. Recent slight gain. LABS DRAWN\par \par Schizoaffective disorder- Pt disagrees with this diagnosis. Pt says he has insomnia and anx/depr.   Dr Borden. Over 30 min spent with pt.\par \par CANCER SCREENING-never had colonoscopy, he agrees to it, will order. Pt did not have it yet. JONG and PSA done 1/8/18, colonoscopy discussed again 12/10/18!!.  4/9/19. 5/28/20 pREVIOUSLY GIVEN A GI REFERRAL.  PSA 5/2020\par \par DJD- knees, 2 prior surgeries. Also has back pain.  Having trouble with ADLs, says he needs help with cleaning and shopping so M11Q filled out. uPDATED NEW ONE.\par \par Fatigue-not a new c/o, previously updated labs\par

## 2020-05-28 NOTE — REASON FOR VISIT
[Follow-Up - Clinic] : a clinic follow-up of [FreeTextEntry1] : PT here for f/u of multiple medical problems. h/o elevated LFTs, prediabetes, BPH, review of cancer screening, etc  He denies that he has Schizoaffective disorder. \par Pt takes Cialis, has tried Tadafinil but results were not as good.  Has BPH and ED from meds.  Saw a Urologist @ [facility] and [facility], as per pt he was told to go to 10mg.  Pt reports mild incontinence, some nocturia.\par \par \par EKG: NSR, normal axis and intervals, no ST-Tw abnormalities.4/9/19\par \par Refuses flu shot, pt here 1/8/19 insisting I call his plan to get Cialis 5mg, i did this but was disconnected after 10min. \par 12/17/19 Dx with major depression by Dr Borden\par 5/28/20 MEDS REVIEWED,  BP ACCEPTABLE, ALL LABS TO BE REPEATED.  REMAINS ANGRY ABOUT PRIOR UROLOGIST, LIVING SITUATION AND ROOMMATE

## 2020-05-28 NOTE — REVIEW OF SYSTEMS
[Urinary Frequency] : urinary frequency [Impotence] : impotence [Joint Pain] : joint pain [Negative] : Heme/Lymph

## 2020-05-29 LAB
ALBUMIN SERPL ELPH-MCNC: 5.1 G/DL
ALP BLD-CCNC: 47 U/L
ALT SERPL-CCNC: 16 U/L
ANION GAP SERPL CALC-SCNC: 14 MMOL/L
APPEARANCE: CLEAR
AST SERPL-CCNC: 22 U/L
BACTERIA: NEGATIVE
BASOPHILS # BLD AUTO: 0.04 K/UL
BASOPHILS NFR BLD AUTO: 0.8 %
BILIRUB SERPL-MCNC: 0.3 MG/DL
BILIRUBIN URINE: NEGATIVE
BLOOD URINE: NEGATIVE
BUN SERPL-MCNC: 19 MG/DL
CALCIUM SERPL-MCNC: 9.9 MG/DL
CHLORIDE SERPL-SCNC: 100 MMOL/L
CHOLEST SERPL-MCNC: 172 MG/DL
CHOLEST/HDLC SERPL: 4.5 RATIO
CO2 SERPL-SCNC: 27 MMOL/L
COLOR: NORMAL
CREAT SERPL-MCNC: 0.85 MG/DL
EOSINOPHIL # BLD AUTO: 0.09 K/UL
EOSINOPHIL NFR BLD AUTO: 1.8 %
ESTIMATED AVERAGE GLUCOSE: 117 MG/DL
GLUCOSE QUALITATIVE U: NEGATIVE
GLUCOSE SERPL-MCNC: 110 MG/DL
HBA1C MFR BLD HPLC: 5.7 %
HCT VFR BLD CALC: 48.7 %
HDLC SERPL-MCNC: 38 MG/DL
HGB BLD-MCNC: 15.7 G/DL
HYALINE CASTS: 0 /LPF
IMM GRANULOCYTES NFR BLD AUTO: 0.2 %
KETONES URINE: NEGATIVE
LDLC SERPL CALC-MCNC: 100 MG/DL
LEUKOCYTE ESTERASE URINE: NEGATIVE
LYMPHOCYTES # BLD AUTO: 1.63 K/UL
LYMPHOCYTES NFR BLD AUTO: 33.5 %
MAN DIFF?: NORMAL
MCHC RBC-ENTMCNC: 31.7 PG
MCHC RBC-ENTMCNC: 32.2 GM/DL
MCV RBC AUTO: 98.4 FL
MICROSCOPIC-UA: NORMAL
MONOCYTES # BLD AUTO: 0.53 K/UL
MONOCYTES NFR BLD AUTO: 10.9 %
NEUTROPHILS # BLD AUTO: 2.57 K/UL
NEUTROPHILS NFR BLD AUTO: 52.8 %
NITRITE URINE: NEGATIVE
PH URINE: 6.5
PLATELET # BLD AUTO: 190 K/UL
POTASSIUM SERPL-SCNC: 5.1 MMOL/L
PROT SERPL-MCNC: 7.4 G/DL
PROTEIN URINE: NEGATIVE
PSA SERPL-MCNC: 1.14 NG/ML
RBC # BLD: 4.95 M/UL
RBC # FLD: 12.4 %
RED BLOOD CELLS URINE: 0 /HPF
SARS-COV-2 IGG SERPL IA-ACNC: <0.1 INDEX
SARS-COV-2 IGG SERPL QL IA: NEGATIVE
SODIUM SERPL-SCNC: 141 MMOL/L
SPECIFIC GRAVITY URINE: 1.01
SQUAMOUS EPITHELIAL CELLS: 0 /HPF
TESTOST SERPL-MCNC: 209 NG/DL
TRIGL SERPL-MCNC: 171 MG/DL
UROBILINOGEN URINE: NORMAL
VALPROATE SERPL-MCNC: 64 UG/ML
WBC # FLD AUTO: 4.87 K/UL
WHITE BLOOD CELLS URINE: 0 /HPF

## 2020-12-08 ENCOUNTER — NON-APPOINTMENT (OUTPATIENT)
Age: 65
End: 2020-12-08

## 2020-12-08 ENCOUNTER — APPOINTMENT (OUTPATIENT)
Dept: HEART AND VASCULAR | Facility: CLINIC | Age: 65
End: 2020-12-08
Payer: MEDICARE

## 2020-12-08 VITALS
TEMPERATURE: 98.6 F | WEIGHT: 235 LBS | HEART RATE: 100 BPM | SYSTOLIC BLOOD PRESSURE: 142 MMHG | BODY MASS INDEX: 29.22 KG/M2 | OXYGEN SATURATION: 97 % | DIASTOLIC BLOOD PRESSURE: 88 MMHG | HEIGHT: 75 IN

## 2020-12-08 PROCEDURE — 36415 COLL VENOUS BLD VENIPUNCTURE: CPT

## 2020-12-08 PROCEDURE — 99214 OFFICE O/P EST MOD 30 MIN: CPT

## 2020-12-08 NOTE — REASON FOR VISIT
[Follow-Up - Clinic] : a clinic follow-up of [FreeTextEntry1] : PT here for f/u of multiple medical problems. h/o elevated LFTs, prediabetes, BPH, review of cancer screening, etc  He denies that he has Schizoaffective disorder. \par Pt takes Cialis, has tried Tadafinil but results were not as good.  Has BPH and ED from meds.  Saw a Urologist @ [facility] and [facility], as per pt he was told to go to 10mg.  Pt reports mild incontinence, some nocturia.\par \par \par EKG: NSR, normal axis and intervals, no ST-Tw abnormalities.19\par \par Refuses flu shot, pt here 19 insisting I call his plan to get Cialis 5mg, i did this but was disconnected after 10min. \par 19 Dx with major depression by Dr Borden\par 20 MEDS REVIEWED,  BP ACCEPTABLE, ALL LABS TO BE REPEATED.  REMAINS ANGRY ABOUT PRIOR UROLOGIST, LIVING SITUATION AND ROOMMATE\par 20 His roommate  of an sepsis, ? OD. Upset

## 2020-12-08 NOTE — PHYSICAL EXAM
[General Appearance - Well Developed] : well developed [Normal Appearance] : normal appearance [Well Groomed] : well groomed [General Appearance - Well Nourished] : well nourished [No Deformities] : no deformities [General Appearance - In No Acute Distress] : no acute distress [Normal Conjunctiva] : the conjunctiva exhibited no abnormalities [Eyelids - No Xanthelasma] : the eyelids demonstrated no xanthelasmas [Respiration, Rhythm And Depth] : normal respiratory rhythm and effort [Exaggerated Use Of Accessory Muscles For Inspiration] : no accessory muscle use [Auscultation Breath Sounds / Voice Sounds] : lungs were clear to auscultation bilaterally [Heart Rate And Rhythm] : heart rate and rhythm were normal [Heart Sounds] : normal S1 and S2 [Murmurs] : no murmurs present [Abdomen Soft] : soft [Abdomen Tenderness] : non-tender [Abdomen Mass (___ Cm)] : no abdominal mass palpated [Abnormal Walk] : normal gait [Gait - Sufficient For Exercise Testing] : the gait was sufficient for exercise testing [Nail Clubbing] : no clubbing of the fingernails [Cyanosis, Localized] : no localized cyanosis [Petechial Hemorrhages (___cm)] : no petechial hemorrhages [Skin Color & Pigmentation] : normal skin color and pigmentation [] : no rash [No Venous Stasis] : no venous stasis [Skin Lesions] : no skin lesions [No Skin Ulcers] : no skin ulcer [No Xanthoma] : no  xanthoma was observed [Oriented To Time, Place, And Person] : oriented to person, place, and time [Mood] : the mood was normal [FreeTextEntry1] : pressured speech

## 2020-12-08 NOTE — ASSESSMENT
[FreeTextEntry1] : Health Maintainence- complete exam including JONG and labs done 1/8/18.  EKG NL.  PPD placed right forearm in 1/2018 negative. Labs updated 5/28/20\par \par BPH-Cialis(Brand) 5 mg RENEWED, Pt needs a non-formulary exception(BPH, failed other meds, Avodart, Flomax, Doxazosin(before me),  Terazosin(before me)Uroxatrol).  Did see Urology Dr Berger.[facility] Rx [employer] MemberID [id number], RxBin [id number], RxPCN [id number], RxGrp [id number], Providers [phone].  Only 5mg for BPH, not ED.\par \par HTN- will follow BP, is losing weight. + wt gain\par \par Prediabetes- pt has lost weight, exercising.  Refuses flu shot 12/10/18 BECAUSE HIS SISTER TOLD HIM IT WAS DANGEROUS.  Losing weight nicely. Recent slight gain. LABS DRAWN\par \par Schizoaffective disorder- Pt disagrees with this diagnosis. Pt says he has insomnia and anx/depr.   Dr Borden. Over 30 min spent with pt.\par \par CANCER SCREENING-never had colonoscopy, he agrees to it, will order. Pt did not have it yet. JONG and PSA done 1/8/18, colonoscopy discussed again 12/10/18!!.  4/9/19. 5/28/20 pREVIOUSLY GIVEN A GI REFERRAL.  PSA 5/2020\par \par DJD- knees, 2 prior surgeries. Also has back pain.  Having trouble with ADLs, says he needs help with cleaning and shopping so M11Q filled out. UPDATED NEW ONE.\par \par Fatigue-not a new c/o, previously updated labs\par

## 2020-12-09 LAB
ALBUMIN SERPL ELPH-MCNC: 4.9 G/DL
ALP BLD-CCNC: 50 U/L
ALT SERPL-CCNC: 23 U/L
ANION GAP SERPL CALC-SCNC: 13 MMOL/L
AST SERPL-CCNC: 24 U/L
BASOPHILS # BLD AUTO: 0.05 K/UL
BASOPHILS NFR BLD AUTO: 0.7 %
BILIRUB SERPL-MCNC: 0.4 MG/DL
BUN SERPL-MCNC: 17 MG/DL
CALCIUM SERPL-MCNC: 9.8 MG/DL
CHLORIDE SERPL-SCNC: 102 MMOL/L
CHOLEST SERPL-MCNC: 168 MG/DL
CO2 SERPL-SCNC: 24 MMOL/L
CREAT SERPL-MCNC: 0.84 MG/DL
EOSINOPHIL # BLD AUTO: 0.09 K/UL
EOSINOPHIL NFR BLD AUTO: 1.3 %
ESTIMATED AVERAGE GLUCOSE: 123 MG/DL
GLUCOSE SERPL-MCNC: 115 MG/DL
HBA1C MFR BLD HPLC: 5.9 %
HCT VFR BLD CALC: 49.5 %
HDLC SERPL-MCNC: 44 MG/DL
HGB BLD-MCNC: 16.3 G/DL
IMM GRANULOCYTES NFR BLD AUTO: 0.3 %
LDLC SERPL CALC-MCNC: 101 MG/DL
LYMPHOCYTES # BLD AUTO: 2.15 K/UL
LYMPHOCYTES NFR BLD AUTO: 32 %
MAN DIFF?: NORMAL
MCHC RBC-ENTMCNC: 32.2 PG
MCHC RBC-ENTMCNC: 32.9 GM/DL
MCV RBC AUTO: 97.8 FL
MONOCYTES # BLD AUTO: 0.59 K/UL
MONOCYTES NFR BLD AUTO: 8.8 %
NEUTROPHILS # BLD AUTO: 3.82 K/UL
NEUTROPHILS NFR BLD AUTO: 56.9 %
NONHDLC SERPL-MCNC: 123 MG/DL
PLATELET # BLD AUTO: 176 K/UL
POTASSIUM SERPL-SCNC: 4.8 MMOL/L
PROT SERPL-MCNC: 7.5 G/DL
RBC # BLD: 5.06 M/UL
RBC # FLD: 13.2 %
SARS-COV-2 IGG SERPL IA-ACNC: 0.08 INDEX
SARS-COV-2 IGG SERPL QL IA: NEGATIVE
SODIUM SERPL-SCNC: 139 MMOL/L
TRIGL SERPL-MCNC: 109 MG/DL
WBC # FLD AUTO: 6.72 K/UL

## 2020-12-16 PROBLEM — Z87.09 HISTORY OF UPPER RESPIRATORY INFECTION: Status: RESOLVED | Noted: 2018-05-01 | Resolved: 2020-12-16

## 2021-06-01 ENCOUNTER — APPOINTMENT (OUTPATIENT)
Dept: HEART AND VASCULAR | Facility: CLINIC | Age: 66
End: 2021-06-01
Payer: MEDICARE

## 2021-06-01 VITALS
OXYGEN SATURATION: 97 % | HEART RATE: 85 BPM | DIASTOLIC BLOOD PRESSURE: 86 MMHG | SYSTOLIC BLOOD PRESSURE: 156 MMHG | BODY MASS INDEX: 29.48 KG/M2 | WEIGHT: 222 LBS

## 2021-06-01 PROCEDURE — 99214 OFFICE O/P EST MOD 30 MIN: CPT

## 2021-06-01 PROCEDURE — 36415 COLL VENOUS BLD VENIPUNCTURE: CPT

## 2021-06-01 PROCEDURE — 93000 ELECTROCARDIOGRAM COMPLETE: CPT

## 2021-06-01 RX ORDER — DIVALPROEX SODIUM 500 1/1
500 TABLET, EXTENDED RELEASE ORAL
Qty: 60 | Refills: 0 | Status: ACTIVE | COMMUNITY
Start: 2021-06-01

## 2021-06-01 RX ORDER — DIVALPROEX SODIUM 250 MG/1
250 TABLET, EXTENDED RELEASE ORAL
Qty: 30 | Refills: 0 | Status: ACTIVE | COMMUNITY
Start: 2021-06-01

## 2021-06-01 NOTE — ASSESSMENT
[FreeTextEntry1] : Health Maintainence- complete exam including JONG and labs done 1/8/18.  EKG NL.  PPD placed right forearm in 1/2018 negative. Labs were drawn today in Office. \par \par BPH-Cialis(Brand) 5 mg RENEWED, Pt needs a non-formulary exception(BPH, failed other meds, Avodart, Flomax, Doxazosin(before me),  Terazosin(before me)Uroxatrol).  Did see Urology Dr Berger.[facility] Rx [employer] MemberID [id number], RxBin [id number], RxPCN [id number], RxGrp PDPLCE1, Providers [phone].  Only 5mg for BPH, not ED.\par \par HTN- will follow BP, is losing weight. + wt gain\par \par Prediabetes- pt has lost weight, exercising.  Refuses flu shot 12/10/18 BECAUSE HIS SISTER TOLD HIM IT WAS DANGEROUS.  Losing weight nicely. Recent slight gain. Labs were drawn today in Office. \par Schizoaffective disorder- Pt disagrees with this diagnosis. Pt says he has insomnia and anx/depr.   Dr Borden.  \par \par CANCER SCREENING-never had colonoscopy, he agrees to it, will order. Pt did not have it yet. JONG and PSA done 1/8/18, colonoscopy discussed again 12/10/18!!.  4/9/19. 5/28/20 pREVIOUSLY GIVEN A GI REFERRAL.  PSA 5/2020, 6/1/21\par \par DJD- knees, 2 prior surgeries. Also has back pain.  Having trouble with ADLs, says he needs help with cleaning and shopping so M11Q filled out. UPDATED NEW ONE.\par \par Fatigue-not a new c/o, previously updated labs\par

## 2021-06-01 NOTE — REASON FOR VISIT
[Follow-Up - Clinic] : a clinic follow-up of [FreeTextEntry1] : PT here for f/u of multiple medical problems. h/o elevated LFTs, prediabetes, BPH, review of cancer screening, etc  He denies that he has Schizoaffective disorder. \par Pt takes Cialis, has tried Tadafinil but results were not as good.  Has BPH and ED from meds.  Saw a Urologist @ [facility] and [facility], as per pt he was told to go to 10mg.  Pt reports mild incontinence, some nocturia.\par \par \par EKG: NSR, normal axis and intervals, no ST-Tw abnormalities.19\par \par Refuses flu shot, pt here 19 insisting I call his plan to get Cialis 5mg, i did this but was disconnected after 10min. \par 19 Dx with major depression by Dr Borden\par 20 MEDS REVIEWED,  BP ACCEPTABLE, ALL LABS TO BE REPEATED.  REMAINS ANGRY ABOUT PRIOR UROLOGIST, LIVING SITUATION AND ROOMMATE\par 20 His roommate  of an sepsis, ? OD. Upset\par 21 Doing well, had Moderna vaccine.

## 2021-06-03 LAB
ALBUMIN SERPL ELPH-MCNC: 5.1 G/DL
ALP BLD-CCNC: 55 U/L
ALT SERPL-CCNC: 29 U/L
ANION GAP SERPL CALC-SCNC: 15 MMOL/L
AST SERPL-CCNC: 28 U/L
BASOPHILS # BLD AUTO: 0.03 K/UL
BASOPHILS NFR BLD AUTO: 0.5 %
BILIRUB SERPL-MCNC: 0.3 MG/DL
BUN SERPL-MCNC: 15 MG/DL
CALCIUM SERPL-MCNC: 9.8 MG/DL
CHLORIDE SERPL-SCNC: 103 MMOL/L
CHOLEST SERPL-MCNC: 166 MG/DL
CO2 SERPL-SCNC: 22 MMOL/L
CREAT SERPL-MCNC: 0.85 MG/DL
EOSINOPHIL # BLD AUTO: 0.08 K/UL
EOSINOPHIL NFR BLD AUTO: 1.4 %
ESTIMATED AVERAGE GLUCOSE: 120 MG/DL
GLUCOSE SERPL-MCNC: 116 MG/DL
HBA1C MFR BLD HPLC: 5.8 %
HCT VFR BLD CALC: 51.5 %
HDLC SERPL-MCNC: 48 MG/DL
HGB BLD-MCNC: 16.7 G/DL
IMM GRANULOCYTES NFR BLD AUTO: 0.3 %
LDLC SERPL CALC-MCNC: 106 MG/DL
LYMPHOCYTES # BLD AUTO: 1.89 K/UL
LYMPHOCYTES NFR BLD AUTO: 32.5 %
MAN DIFF?: NORMAL
MCHC RBC-ENTMCNC: 32.4 GM/DL
MCHC RBC-ENTMCNC: 32.7 PG
MCV RBC AUTO: 101 FL
MONOCYTES # BLD AUTO: 0.47 K/UL
MONOCYTES NFR BLD AUTO: 8.1 %
NEUTROPHILS # BLD AUTO: 3.32 K/UL
NEUTROPHILS NFR BLD AUTO: 57.2 %
NONHDLC SERPL-MCNC: 118 MG/DL
PLATELET # BLD AUTO: 218 K/UL
POTASSIUM SERPL-SCNC: 4.9 MMOL/L
PROLACTIN SERPL-MCNC: 3.8 NG/ML
PROT SERPL-MCNC: 7.6 G/DL
PSA SERPL-MCNC: 1.29 NG/ML
RBC # BLD: 5.1 M/UL
RBC # FLD: 12.9 %
SODIUM SERPL-SCNC: 140 MMOL/L
TESTOST SERPL-MCNC: 528 NG/DL
TRIGL SERPL-MCNC: 59 MG/DL
TSH SERPL-ACNC: 1.58 UIU/ML
VALPROATE SERPL-MCNC: 55 UG/ML
WBC # FLD AUTO: 5.81 K/UL

## 2021-06-10 LAB — SEROTONIN SERUM: 100 NG/ML

## 2021-08-10 ENCOUNTER — APPOINTMENT (OUTPATIENT)
Dept: HEART AND VASCULAR | Facility: CLINIC | Age: 66
End: 2021-08-10

## 2021-11-30 ENCOUNTER — NON-APPOINTMENT (OUTPATIENT)
Age: 66
End: 2021-11-30

## 2021-12-07 ENCOUNTER — APPOINTMENT (OUTPATIENT)
Dept: HEART AND VASCULAR | Facility: CLINIC | Age: 66
End: 2021-12-07
Payer: MEDICARE

## 2021-12-07 VITALS
WEIGHT: 237.99 LBS | HEART RATE: 88 BPM | DIASTOLIC BLOOD PRESSURE: 82 MMHG | HEIGHT: 72 IN | BODY MASS INDEX: 32.23 KG/M2 | SYSTOLIC BLOOD PRESSURE: 139 MMHG | TEMPERATURE: 97.3 F | OXYGEN SATURATION: 98 %

## 2021-12-07 PROCEDURE — 99214 OFFICE O/P EST MOD 30 MIN: CPT

## 2021-12-07 PROCEDURE — 36415 COLL VENOUS BLD VENIPUNCTURE: CPT

## 2021-12-07 RX ORDER — TADALAFIL 5 MG/1
5 TABLET, FILM COATED ORAL
Refills: 0 | Status: DISCONTINUED | COMMUNITY
End: 2021-12-07

## 2021-12-07 RX ORDER — TADALAFIL 5 MG/1
5 TABLET ORAL
Qty: 90 | Refills: 0 | Status: DISCONTINUED | COMMUNITY
End: 2021-12-07

## 2021-12-07 NOTE — ASSESSMENT
[FreeTextEntry1] : Health Maintainence- complete exam including JONG and labs done 1/8/18.  EKG NL.  PPD placed right forearm in 1/2018 negative. Labs were drawn today in Office. \par \par BPH-Cialis(Brand) 5 mg RENEWED, Pt needs a non-formulary exception(BPH, failed other meds, Avodart, Flomax, Doxazosin(before me),  Terazosin(before me)Uroxatrol).  Did see Urology Dr Berger.[facility] Rx [employer]'s MemberID [id number], RxBin [id number], RxPCN 9999, RxGrp PDPLCE1, Providers [phone].  Only 5mg for BPH, not ED.\par \par HTN- will follow BP, is losing weight. + wt gain\par \par Prediabetes- pt has lost weight, exercising.  Refuses flu shot 12/10/18 BECAUSE HIS SISTER TOLD HIM IT WAS DANGEROUS.  Losing weight nicely. Recent slight gain. Labs were drawn today in Office. \par \par Schizoaffective disorder- Pt disagrees with this diagnosis. Pt says he has insomnia and anx/depr.   Dr Borden.  \par \par CANCER SCREENING-never had colonoscopy, he agrees to it, will order. Pt did not have it yet. JONG and PSA done 1/8/18, colonoscopy discussed again 12/10/18!!.  4/9/19. 5/28/20 pREVIOUSLY GIVEN A GI REFERRAL.  PSA 5/2020, 6/1/21\par \par DJD- knees, 2 prior surgeries. Also has back pain.  Having trouble with ADLs, says he needs help with cleaning and shopping so M11Q filled out. UPDATED NEW ONE.\par \par Fatigue-not a new c/o, previously updated labs\par

## 2021-12-07 NOTE — REVIEW OF SYSTEMS
[Weight Loss (___ Lbs)] : [unfilled] ~Ulb weight loss [Negative] : Heme/Lymph [Anxiety] : anxiety [Under Stress] : under stress

## 2021-12-07 NOTE — HISTORY OF PRESENT ILLNESS
[FreeTextEntry1] : Psych- Dr Borden\par Pod- Dr Gleason\par -\par Optometry- [facility] on [place]

## 2021-12-07 NOTE — REASON FOR VISIT
[Follow-Up - Clinic] : a clinic follow-up of [FreeTextEntry1] : PT here for f/u of multiple medical problems. h/o elevated LFTs, prediabetes, BPH, review of cancer screening, etc  He denies that he has Schizoaffective disorder. \par Pt takes Cialis, has tried Tadafinil but results were not as good.  Has BPH and ED from meds.  Saw a Urologist @ [facility] and [facility], as per pt he was told to go to 10mg.  Pt reports mild incontinence, some nocturia.\par \par \par EKG: NSR, normal axis and intervals, no ST-Tw abnormalities.19\par \par Refuses flu shot, pt here 19 insisting I call his plan to get Cialis 5mg, i did this but was disconnected after 10min. \par 19 Dx with major depression by Dr Borden\par 20 MEDS REVIEWED,  BP ACCEPTABLE, ALL LABS TO BE REPEATED.  REMAINS ANGRY ABOUT PRIOR UROLOGIST, LIVING SITUATION AND ROOMMATE\par 20 His roommate  of an sepsis, ? OD. Upset\par 21 Doing well, had Moderna vaccine.\par 21 Roommate , has a new roommate aged 25, a heavy pot smoker.  He gets angry describing this to me.  He reviewed getting Cialis again for . It also induces a lot of stress.

## 2021-12-08 LAB
ALBUMIN SERPL ELPH-MCNC: 4.9 G/DL
ALP BLD-CCNC: 58 U/L
ALT SERPL-CCNC: 27 U/L
ANION GAP SERPL CALC-SCNC: 11 MMOL/L
APPEARANCE: CLEAR
AST SERPL-CCNC: 24 U/L
BACTERIA: NEGATIVE
BASOPHILS # BLD AUTO: 0.03 K/UL
BASOPHILS NFR BLD AUTO: 0.6 %
BILIRUB SERPL-MCNC: 0.3 MG/DL
BILIRUBIN URINE: NEGATIVE
BLOOD URINE: NEGATIVE
BUN SERPL-MCNC: 16 MG/DL
CALCIUM SERPL-MCNC: 9.8 MG/DL
CHLORIDE SERPL-SCNC: 101 MMOL/L
CHOLEST SERPL-MCNC: 170 MG/DL
CO2 SERPL-SCNC: 27 MMOL/L
COLOR: NORMAL
CREAT SERPL-MCNC: 0.93 MG/DL
EOSINOPHIL # BLD AUTO: 0.09 K/UL
EOSINOPHIL NFR BLD AUTO: 1.9 %
ESTIMATED AVERAGE GLUCOSE: 123 MG/DL
GLUCOSE QUALITATIVE U: NEGATIVE
GLUCOSE SERPL-MCNC: 108 MG/DL
HBA1C MFR BLD HPLC: 5.9 %
HCT VFR BLD CALC: 47.6 %
HDLC SERPL-MCNC: 43 MG/DL
HGB BLD-MCNC: 16.2 G/DL
HYALINE CASTS: 0 /LPF
IMM GRANULOCYTES NFR BLD AUTO: 0.4 %
KETONES URINE: NORMAL
LDLC SERPL CALC-MCNC: 102 MG/DL
LEUKOCYTE ESTERASE URINE: NEGATIVE
LYMPHOCYTES # BLD AUTO: 1.93 K/UL
LYMPHOCYTES NFR BLD AUTO: 40.4 %
MAN DIFF?: NORMAL
MCHC RBC-ENTMCNC: 33.1 PG
MCHC RBC-ENTMCNC: 34 GM/DL
MCV RBC AUTO: 97.1 FL
MICROSCOPIC-UA: NORMAL
MONOCYTES # BLD AUTO: 0.5 K/UL
MONOCYTES NFR BLD AUTO: 10.5 %
NEUTROPHILS # BLD AUTO: 2.21 K/UL
NEUTROPHILS NFR BLD AUTO: 46.2 %
NITRITE URINE: NEGATIVE
NONHDLC SERPL-MCNC: 126 MG/DL
PH URINE: 6
PLATELET # BLD AUTO: 184 K/UL
POTASSIUM SERPL-SCNC: 4.9 MMOL/L
PROT SERPL-MCNC: 7.2 G/DL
PROTEIN URINE: NEGATIVE
RBC # BLD: 4.9 M/UL
RBC # FLD: 12.7 %
RED BLOOD CELLS URINE: 1 /HPF
SODIUM SERPL-SCNC: 140 MMOL/L
SPECIFIC GRAVITY URINE: 1.01
SQUAMOUS EPITHELIAL CELLS: 0 /HPF
TRIGL SERPL-MCNC: 119 MG/DL
UROBILINOGEN URINE: NORMAL
VALPROATE SERPL-MCNC: 89 UG/ML
WBC # FLD AUTO: 4.78 K/UL
WHITE BLOOD CELLS URINE: 0 /HPF

## 2022-03-14 ENCOUNTER — NON-APPOINTMENT (OUTPATIENT)
Age: 67
End: 2022-03-14

## 2022-03-14 ENCOUNTER — APPOINTMENT (OUTPATIENT)
Dept: HEART AND VASCULAR | Facility: CLINIC | Age: 67
End: 2022-03-14
Payer: MEDICARE

## 2022-03-14 VITALS
SYSTOLIC BLOOD PRESSURE: 138 MMHG | HEIGHT: 72 IN | BODY MASS INDEX: 33.18 KG/M2 | OXYGEN SATURATION: 99 % | DIASTOLIC BLOOD PRESSURE: 87 MMHG | TEMPERATURE: 97 F | WEIGHT: 245 LBS | HEART RATE: 105 BPM

## 2022-03-14 VITALS — HEART RATE: 105 BPM | OXYGEN SATURATION: 99 % | WEIGHT: 245 LBS | BODY MASS INDEX: 33.18 KG/M2 | HEIGHT: 72 IN

## 2022-03-14 PROCEDURE — 99214 OFFICE O/P EST MOD 30 MIN: CPT

## 2022-03-14 PROCEDURE — 93000 ELECTROCARDIOGRAM COMPLETE: CPT

## 2022-03-14 NOTE — PHYSICAL EXAM
[General Appearance - Well Developed] : well developed [Normal Appearance] : normal appearance [Well Groomed] : well groomed [General Appearance - Well Nourished] : well nourished [No Deformities] : no deformities [General Appearance - In No Acute Distress] : no acute distress [Normal Conjunctiva] : the conjunctiva exhibited no abnormalities [Eyelids - No Xanthelasma] : the eyelids demonstrated no xanthelasmas [Respiration, Rhythm And Depth] : normal respiratory rhythm and effort [Exaggerated Use Of Accessory Muscles For Inspiration] : no accessory muscle use [Auscultation Breath Sounds / Voice Sounds] : lungs were clear to auscultation bilaterally [Heart Rate And Rhythm] : heart rate and rhythm were normal [Heart Sounds] : normal S1 and S2 [Murmurs] : no murmurs present [Abdomen Soft] : soft [Abdomen Tenderness] : non-tender [Abdomen Mass (___ Cm)] : no abdominal mass palpated [Abnormal Walk] : normal gait [Gait - Sufficient For Exercise Testing] : the gait was sufficient for exercise testing [Nail Clubbing] : no clubbing of the fingernails [Petechial Hemorrhages (___cm)] : no petechial hemorrhages [Cyanosis, Localized] : no localized cyanosis [Skin Color & Pigmentation] : normal skin color and pigmentation [] : no rash [No Venous Stasis] : no venous stasis [Skin Lesions] : no skin lesions [No Skin Ulcers] : no skin ulcer [No Xanthoma] : no  xanthoma was observed [Oriented To Time, Place, And Person] : oriented to person, place, and time [Mood] : the mood was normal [FreeTextEntry1] : pressured speech

## 2022-03-14 NOTE — HISTORY OF PRESENT ILLNESS
[FreeTextEntry1] : Psych- Dr Borden\par Pod- Dr Gleason\par -\par Optometry- [facility] on [place]

## 2022-03-14 NOTE — ASSESSMENT
[FreeTextEntry1] : Health Maintainence- complete exam including JONG and labs done 1/8/18.  EKG NL.  PPD placed right forearm in 1/2018 negative.  M11q was filled out.\par \par BPH-Cialis(Brand) 5 mg RENEWED, Pt needs a non-formulary exception(BPH, failed other meds, Avodart, Flomax, Doxazosin(before me),  Terazosin(before me)Uroxatrol).  Did see Urology Dr Berger.[facility] Rx [employer] MemberID [id number], RxBin [id number], RxPCN [id number], RxGrp PDPLCE1, Providers [phone].  Only 5mg for BPH, not ED.\par \par HTN- will follow BP, is losing weight. + wt gain\par \par Prediabetes- pt has lost weight, exercising.  Refuses flu shot 12/10/18 BECAUSE HIS SISTER TOLD HIM IT WAS DANGEROUS.  Losing weight nicely. Recent slight gain. Labs were drawn today in Office. \par \par Schizoaffective disorder- Pt disagrees with this diagnosis. Pt says he has insomnia and anx/depr.   Dr Borden.  It is always draining to see him, he repeats the same issue over and over.\par \par CANCER SCREENING-never had colonoscopy, he agrees to it, will order. Pt did not have it yet. JONG and PSA done 1/8/18, colonoscopy discussed again 12/10/18!!.  4/9/19. 5/28/20 PREVIOUSLY GIVEN A GI REFERRAL.  PSA 5/2020, 6/1/21.  Pt agrees to colonoscopy.\par \par DJD- knees, 2 prior surgeries. Also has back pain.  Advised to lose weight. Having trouble with ADLs, says he needs help with cleaning and shopping so M11Q filled out. UPDATED NEW ONE 3/14/22.\par \par Fatigue-not a new c/o, previously updated labs\par

## 2022-03-14 NOTE — REVIEW OF SYSTEMS
[Weight Loss (___ Lbs)] : [unfilled] ~Ulb weight loss [Anxiety] : anxiety [Under Stress] : under stress [Negative] : Heme/Lymph

## 2022-03-14 NOTE — REASON FOR VISIT
[Follow-Up - Clinic] : a clinic follow-up of [FreeTextEntry1] : PT here for f/u of multiple medical problems. h/o elevated LFTs, prediabetes, BPH, review of cancer screening, etc  He denies that he has Schizoaffective disorder. \par Pt takes Cialis, has tried Tadafinil but results were not as good.  Has BPH and ED from meds.  Saw a Urologist @ [facility] and [facility], as per pt he was told to go to 10mg.  Pt reports mild incontinence, some nocturia.\par \par \par EKG: NSR, normal axis and intervals, no ST-Tw abnormalities.19\par \par Refuses flu shot, pt here 19 insisting I call his plan to get Cialis 5mg, i did this but was disconnected after 10min. \par 19 Dx with major depression by Dr Borden\par 20 MEDS REVIEWED,  BP ACCEPTABLE, ALL LABS TO BE REPEATED.  REMAINS ANGRY ABOUT PRIOR UROLOGIST, LIVING SITUATION AND ROOMMATE\par 20 His roommate  of an sepsis, ? OD. Upset\par 21 Doing well, had Moderna vaccine.\par 21 Roommate , has a new roommate aged 25, a heavy pot smoker.  He gets angry describing this to me.  He reviewed getting Cialis again for . It also induces a lot of stress.\par 3/14/22 Having more knee pain.  Needs Ortho, needs Colonoscopy

## 2022-06-21 ENCOUNTER — APPOINTMENT (OUTPATIENT)
Dept: HEART AND VASCULAR | Facility: CLINIC | Age: 67
End: 2022-06-21
Payer: MEDICARE

## 2022-06-21 VITALS
OXYGEN SATURATION: 99 % | DIASTOLIC BLOOD PRESSURE: 84 MMHG | BODY MASS INDEX: 32.1 KG/M2 | SYSTOLIC BLOOD PRESSURE: 140 MMHG | TEMPERATURE: 97.2 F | HEIGHT: 72 IN | HEART RATE: 90 BPM | WEIGHT: 237 LBS

## 2022-06-21 DIAGNOSIS — F25.9 SCHIZOAFFECTIVE DISORDER, UNSPECIFIED: ICD-10-CM

## 2022-06-21 DIAGNOSIS — N40.0 BENIGN PROSTATIC HYPERPLASIA WITHOUT LOWER URINARY TRACT SYMPMS: ICD-10-CM

## 2022-06-21 PROCEDURE — 99214 OFFICE O/P EST MOD 30 MIN: CPT

## 2022-06-21 PROCEDURE — 36415 COLL VENOUS BLD VENIPUNCTURE: CPT

## 2022-06-21 NOTE — REASON FOR VISIT
[Follow-Up - Clinic] : a clinic follow-up of [FreeTextEntry1] : PT here for f/u of multiple medical problems. h/o elevated LFTs, prediabetes, BPH, review of cancer screening, etc  He denies that he has Schizoaffective disorder. \par Pt takes Cialis, has tried Tadafinil but results were not as good.  Has BPH and ED from meds.  Saw a Urologist @ [facility] and [facility], as per pt he was told to go to 10mg.  Pt reports mild incontinence, some nocturia.\par \par \par EKG: NSR, normal axis and intervals, no ST-Tw abnormalities.19\par \par Refuses flu shot, pt here 19 insisting I call his plan to get Cialis 5mg, i did this but was disconnected after 10min. \par 19 Dx with major depression by Dr Borden\par 20 MEDS REVIEWED,  BP ACCEPTABLE, ALL LABS TO BE REPEATED.  REMAINS ANGRY ABOUT PRIOR UROLOGIST, LIVING SITUATION AND ROOMMATE\par 20 His roommate  of an sepsis, ? OD. Upset\par 21 Doing well, had Moderna vaccine.\par 21 Roommate , has a new roommate aged 25, a heavy pot smoker.  He gets angry describing this to me.  He reviewed getting Cialis again for . It also induces a lot of stress.\par 3/14/22 Having more knee pain.  Needs Ortho, needs Colonoscopy\par 22 Has a new address.  Much better situation. Questioning his Schizoaffective dx.

## 2022-06-21 NOTE — ASSESSMENT
[FreeTextEntry1] : Health Maintainence- complete exam including JONG and labs done 1/8/18.  EKG NL.  PPD placed right forearm in 1/2018 negative.  M11q was filled out.\par \par BPH-Cialis(Brand) 5 mg RENEWED, Pt needs a non-formulary exception(BPH, failed other meds, Avodart, Flomax, Doxazosin(before me),  Terazosin(before me)Uroxatrol).  Did see Urology Dr Berger.[facility] Rx [employer] MemberID [id number], RxBin [id number], RxPCN [id number], RxGrp PDPLCE1, Providers [phone].  Only 5mg for BPH, not ED.\par \par HTN- will follow BP, is losing weight. + wt gain\par \par Prediabetes- pt has lost weight, exercising.  Refuses flu shot 12/10/18 BECAUSE HIS SISTER TOLD HIM IT WAS DANGEROUS.  Losing weight nicely. Recent slight gain. Labs were drawn today in Office. \par \par Schizoaffective disorder- Pt disagrees with this diagnosis. Pt says he has insomnia and anx/depr.   Dr Borden.  It is always draining to see him, he repeats the same issue over and over. \par \par CANCER SCREENING-never had colonoscopy, he agrees to it, will order. Pt did not have it yet. JONG and PSA done 1/8/18, colonoscopy discussed again 12/10/18!!.  4/9/19. 5/28/20 PREVIOUSLY GIVEN A GI REFERRAL.  PSA 5/2020, 6/1/21, 6/21/22..  Pt agrees to colonoscopy. Not done yet!!\par \par DJD- knees, 2 prior surgeries. Also has back pain.  Advised to lose weight. Having trouble with ADLs, says he needs help with cleaning and shopping so M11Q filled out. UPDATED NEW ONE 3/14/22.\par \par Fatigue-not a new c/o, previously updated labs\par

## 2022-06-23 LAB
ALBUMIN SERPL ELPH-MCNC: 5.1 G/DL
ALP BLD-CCNC: 55 U/L
ALT SERPL-CCNC: 26 U/L
ANION GAP SERPL CALC-SCNC: 19 MMOL/L
APPEARANCE: CLEAR
AST SERPL-CCNC: 30 U/L
BACTERIA: NEGATIVE
BASOPHILS # BLD AUTO: 0.03 K/UL
BASOPHILS NFR BLD AUTO: 0.5 %
BILIRUB SERPL-MCNC: 0.4 MG/DL
BILIRUBIN URINE: NEGATIVE
BLOOD URINE: NEGATIVE
BUN SERPL-MCNC: 16 MG/DL
CALCIUM SERPL-MCNC: 9.9 MG/DL
CHLORIDE SERPL-SCNC: 100 MMOL/L
CHOLEST SERPL-MCNC: 186 MG/DL
CO2 SERPL-SCNC: 24 MMOL/L
COLOR: YELLOW
CREAT SERPL-MCNC: 0.83 MG/DL
EGFR: 97 ML/MIN/1.73M2
EOSINOPHIL # BLD AUTO: 0.07 K/UL
EOSINOPHIL NFR BLD AUTO: 1.2 %
ESTIMATED AVERAGE GLUCOSE: 128 MG/DL
GLUCOSE QUALITATIVE U: NEGATIVE
GLUCOSE SERPL-MCNC: 109 MG/DL
HBA1C MFR BLD HPLC: 6.1 %
HCT VFR BLD CALC: 50 %
HDLC SERPL-MCNC: 45 MG/DL
HGB BLD-MCNC: 16.3 G/DL
HYALINE CASTS: 0 /LPF
IMM GRANULOCYTES NFR BLD AUTO: 0.2 %
KETONES URINE: NORMAL
LDLC SERPL CALC-MCNC: 120 MG/DL
LEUKOCYTE ESTERASE URINE: NEGATIVE
LYMPHOCYTES # BLD AUTO: 1.89 K/UL
LYMPHOCYTES NFR BLD AUTO: 33.3 %
MAN DIFF?: NORMAL
MCHC RBC-ENTMCNC: 32.3 PG
MCHC RBC-ENTMCNC: 32.6 GM/DL
MCV RBC AUTO: 99.2 FL
MICROSCOPIC-UA: NORMAL
MONOCYTES # BLD AUTO: 0.56 K/UL
MONOCYTES NFR BLD AUTO: 9.9 %
NEUTROPHILS # BLD AUTO: 3.12 K/UL
NEUTROPHILS NFR BLD AUTO: 54.9 %
NITRITE URINE: NEGATIVE
NONHDLC SERPL-MCNC: 141 MG/DL
PH URINE: 6
PLATELET # BLD AUTO: 214 K/UL
POTASSIUM SERPL-SCNC: 5.1 MMOL/L
PROT SERPL-MCNC: 7.4 G/DL
PROTEIN URINE: NEGATIVE
PSA SERPL-MCNC: 0.93 NG/ML
RBC # BLD: 5.04 M/UL
RBC # FLD: 12.6 %
RED BLOOD CELLS URINE: 1 /HPF
SODIUM SERPL-SCNC: 142 MMOL/L
SPECIFIC GRAVITY URINE: 1.01
SQUAMOUS EPITHELIAL CELLS: 0 /HPF
TRIGL SERPL-MCNC: 105 MG/DL
UROBILINOGEN URINE: NORMAL
WBC # FLD AUTO: 5.68 K/UL
WHITE BLOOD CELLS URINE: 0 /HPF

## 2022-12-15 ENCOUNTER — APPOINTMENT (OUTPATIENT)
Dept: HEART AND VASCULAR | Facility: CLINIC | Age: 67
End: 2022-12-15

## 2022-12-15 VITALS
SYSTOLIC BLOOD PRESSURE: 136 MMHG | TEMPERATURE: 98.3 F | OXYGEN SATURATION: 97 % | DIASTOLIC BLOOD PRESSURE: 78 MMHG | WEIGHT: 243 LBS | HEART RATE: 89 BPM | HEIGHT: 72 IN | BODY MASS INDEX: 32.91 KG/M2

## 2022-12-15 PROCEDURE — 99214 OFFICE O/P EST MOD 30 MIN: CPT

## 2022-12-15 PROCEDURE — 36415 COLL VENOUS BLD VENIPUNCTURE: CPT

## 2022-12-15 RX ORDER — LORAZEPAM 1 MG/1
1 TABLET ORAL
Refills: 0 | Status: ACTIVE | COMMUNITY
Start: 2018-02-14

## 2022-12-15 NOTE — HISTORY OF PRESENT ILLNESS
[FreeTextEntry1] : Psych- Dr Borden\par Pod- Dr Gleason\par -\par Optometry- [facility] on [place]

## 2022-12-15 NOTE — ASSESSMENT
[FreeTextEntry1] : Health Maintainence- complete exam including JONG and labs done 1/8/18.  EKG NL.  PPD placed right forearm in 1/2018 negative.  M11q was filled out.\par \par BPH-Cialis(Brand) 5 mg RENEWED, Pt needs a non-formulary exception(BPH, failed other meds, Avodart, Flomax, Doxazosin(before me),  Terazosin(before me)Uroxatrol).  Did see Urology Dr Berger.[facility] Rx [employer] MemberID [id number], RxBin [id number], RxPCN [id number], RxGrp PDPLCE1, Providers [phone].  Only 5mg for BPH, not ED.\par \par HTN- will follow BP, is losing weight. + wt gain.  Gained wt.\par \par Prediabetes- pt has lost weight, exercising.  Refuses flu shot 12/10/18 BECAUSE HIS SISTER TOLD HIM IT WAS DANGEROUS.  Losing weight nicely. Recent slight gain. Labs were drawn today in Office. \par \par Schizoaffective disorder- Pt disagrees with this diagnosis. Pt says he has insomnia and anx/depr.   Dr Borden.  It is always draining to see him, he repeats the same issue over and over. \par \par CANCER SCREENING-never had colonoscopy, he agrees to it, will order. Pt did not have it yet. JONG and PSA done 1/8/18, colonoscopy discussed again 12/10/18!!.  4/9/19. 5/28/20 PREVIOUSLY GIVEN A GI REFERRAL.  PSA 5/2020, 6/1/21, 6/21/22..  Pt agrees to colonoscopy. Done at [place] 2022.\par \par DJD- knees, 2 prior surgeries. Also has back pain.  Advised to lose weight. Having trouble with ADLs, says he needs help with cleaning and shopping so M11Q filled out. UPDATED NEW ONE 3/14/22.\par \par Fatigue-not a new c/o, previously updated labs\par

## 2022-12-15 NOTE — REASON FOR VISIT
[Follow-Up - Clinic] : a clinic follow-up of [FreeTextEntry1] : PT here for f/u of multiple medical problems. h/o elevated LFTs, prediabetes, BPH, review of cancer screening, etc  He denies that he has Schizoaffective disorder. \par Pt takes Cialis, has tried Tadafinil but results were not as good.  Has BPH and ED from meds.  Saw a Urologist @ [facility] and [facility], as per pt he was told to go to 10mg.  Pt reports mild incontinence, some nocturia.\par \par \par EKG: NSR, normal axis and intervals, no ST-Tw abnormalities.19\par \par Refuses flu shot, pt here 19 insisting I call his plan to get Cialis 5mg, i did this but was disconnected after 10min. \par 19 Dx with major depression by Dr Borden\par 20 MEDS REVIEWED,  BP ACCEPTABLE, ALL LABS TO BE REPEATED.  REMAINS ANGRY ABOUT PRIOR UROLOGIST, LIVING SITUATION AND ROOMMATE\par 20 His roommate  of an sepsis, ? OD. Upset\par 21 Doing well, had Moderna vaccine.\par 21 Roommate , has a new roommate aged 25, a heavy pot smoker.  He gets angry describing this to me.  He reviewed getting Cialis again for . It also induces a lot of stress.\par 3/14/22 Having more knee pain.  Needs Ortho, needs Colonoscopy\par 22 Has a new address.  Much better situation. Questioning his Schizoaffective dx.\par 12/15/22 Had his colonoscopy, + hemorrhoids.

## 2022-12-19 LAB
ALBUMIN SERPL ELPH-MCNC: 5 G/DL
ALP BLD-CCNC: 57 U/L
ALT SERPL-CCNC: 29 U/L
ANION GAP SERPL CALC-SCNC: 17 MMOL/L
AST SERPL-CCNC: 26 U/L
BASOPHILS # BLD AUTO: 0.04 K/UL
BASOPHILS NFR BLD AUTO: 0.8 %
BILIRUB SERPL-MCNC: 0.3 MG/DL
BUN SERPL-MCNC: 16 MG/DL
CALCIUM SERPL-MCNC: 10 MG/DL
CHLORIDE SERPL-SCNC: 99 MMOL/L
CHOLEST SERPL-MCNC: 194 MG/DL
CO2 SERPL-SCNC: 23 MMOL/L
CREAT SERPL-MCNC: 0.82 MG/DL
EGFR: 97 ML/MIN/1.73M2
EOSINOPHIL # BLD AUTO: 0.08 K/UL
EOSINOPHIL NFR BLD AUTO: 1.6 %
ESTIMATED AVERAGE GLUCOSE: 128 MG/DL
GLUCOSE SERPL-MCNC: 117 MG/DL
HBA1C MFR BLD HPLC: 6.1 %
HCT VFR BLD CALC: 48.3 %
HDLC SERPL-MCNC: 48 MG/DL
HGB BLD-MCNC: 16.4 G/DL
IMM GRANULOCYTES NFR BLD AUTO: 0.4 %
LDLC SERPL CALC-MCNC: 122 MG/DL
LYMPHOCYTES # BLD AUTO: 2.07 K/UL
LYMPHOCYTES NFR BLD AUTO: 40.7 %
MAN DIFF?: NORMAL
MCHC RBC-ENTMCNC: 32.6 PG
MCHC RBC-ENTMCNC: 34 GM/DL
MCV RBC AUTO: 96 FL
MONOCYTES # BLD AUTO: 0.54 K/UL
MONOCYTES NFR BLD AUTO: 10.6 %
NEUTROPHILS # BLD AUTO: 2.34 K/UL
NEUTROPHILS NFR BLD AUTO: 45.9 %
NONHDLC SERPL-MCNC: 146 MG/DL
PLATELET # BLD AUTO: 190 K/UL
POTASSIUM SERPL-SCNC: 4.3 MMOL/L
PROT SERPL-MCNC: 7.7 G/DL
RBC # BLD: 5.03 M/UL
RBC # FLD: 12.9 %
SODIUM SERPL-SCNC: 139 MMOL/L
TRIGL SERPL-MCNC: 117 MG/DL
VALPROATE SERPL-MCNC: 66 UG/ML
WBC # FLD AUTO: 5.09 K/UL

## 2023-06-15 ENCOUNTER — NON-APPOINTMENT (OUTPATIENT)
Age: 68
End: 2023-06-15

## 2023-06-15 ENCOUNTER — APPOINTMENT (OUTPATIENT)
Dept: HEART AND VASCULAR | Facility: CLINIC | Age: 68
End: 2023-06-15
Payer: MEDICARE

## 2023-06-15 VITALS — DIASTOLIC BLOOD PRESSURE: 90 MMHG | SYSTOLIC BLOOD PRESSURE: 138 MMHG

## 2023-06-15 VITALS
TEMPERATURE: 98.6 F | OXYGEN SATURATION: 98 % | HEART RATE: 94 BPM | HEIGHT: 72 IN | DIASTOLIC BLOOD PRESSURE: 90 MMHG | WEIGHT: 249 LBS | SYSTOLIC BLOOD PRESSURE: 150 MMHG | BODY MASS INDEX: 33.72 KG/M2

## 2023-06-15 PROCEDURE — 93000 ELECTROCARDIOGRAM COMPLETE: CPT

## 2023-06-15 PROCEDURE — 36415 COLL VENOUS BLD VENIPUNCTURE: CPT

## 2023-06-15 PROCEDURE — 99214 OFFICE O/P EST MOD 30 MIN: CPT

## 2023-06-15 RX ORDER — QUETIAPINE 400 MG/1
400 TABLET, FILM COATED ORAL
Refills: 0 | Status: ACTIVE | COMMUNITY

## 2023-06-15 NOTE — REASON FOR VISIT
[Follow-Up - Clinic] : a clinic follow-up of [FreeTextEntry1] : PT here for f/u of multiple medical problems. h/o elevated LFTs, prediabetes, BPH, review of cancer screening, etc  He denies that he has Schizoaffective disorder. \par Pt takes Cialis, has tried Tadafinil but results were not as good.  Has BPH and ED from meds.  Saw a Urologist @ [facility] and [facility], as per pt he was told to go to 10mg.  Pt reports mild incontinence, some nocturia.\par \par \par EKG: NSR, normal axis and intervals, no ST-Tw abnormalities.19\par \par Refuses flu shot, pt here 19 insisting I call his plan to get Cialis 5mg, i did this but was disconnected after 10min. \par 19 Dx with major depression by Dr Borden\par 20 MEDS REVIEWED,  BP ACCEPTABLE, ALL LABS TO BE REPEATED.  REMAINS ANGRY ABOUT PRIOR UROLOGIST, LIVING SITUATION AND ROOMMATE\par 20 His roommate  of an sepsis, ? OD. Upset\par 21 Doing well, had Moderna vaccine.\par 21 Roommate , has a new roommate aged 25, a heavy pot smoker.  He gets angry describing this to me.  He reviewed getting Cialis again for . It also induces a lot of stress.\par 3/14/22 Having more knee pain.  Needs Ortho, needs Colonoscopy\par 22 Has a new address.  Much better situation. Questioning his Schizoaffective dx.\par 12/15/22 Had his colonoscopy, + hemorrhoids.

## 2023-06-15 NOTE — ASSESSMENT
[FreeTextEntry1] : Health Maintainence- complete exam including JONG and labs done 1/8/18.  EKG NL.  PPD placed right forearm in 1/2018 negative.  M11q was filled out.\par \par BPH-Cialis(Brand) 5 mg RENEWED, Pt needs a non-formulary exception(BPH, failed other meds, Avodart, Flomax, Doxazosin(before me),  Terazosin(before me)Uroxatrol).  Did see Urology Dr Berger.[facility] Rx Wal[employer]'s MemberID [id number], RxBin [id number], RxPCN 9999, RxGrp PDPLCE1, Providers [phone].  Only 5mg for BPH, not ED.\par \par HTN- will follow BP, is losing weight. + wt gain.  Gained wt.\par \par Prediabetes- pt has lost weight, exercising.  Refuses flu shot 12/10/18 BECAUSE HIS SISTER TOLD HIM IT WAS DANGEROUS.  Losing weight nicely. Recent slight gain. Labs were drawn today in Office. \par \par Schizoaffective disorder- Pt disagrees with this diagnosis. Pt says he has insomnia and anx/depr.   Dr Borden.  It is always draining to see him, he repeats the same issue over and over. \par \par CANCER SCREENING-never had colonoscopy, he agrees to it, will order. Pt did not have it yet. JONG and PSA done 1/8/18, colonoscopy discussed again 12/10/18!!.  4/9/19. 5/28/20 PREVIOUSLY GIVEN A GI REFERRAL.  PSA 5/2020, 6/1/21, 6/21/22..  Pt agrees to colonoscopy. Done at [place] 2022.\par \par DJD- knees, 2 prior surgeries. Also has back pain.  Advised to lose weight. Having trouble with ADLs, says he needs help with cleaning and shopping so M11Q filled out. UPDATED NEW ONE 3/14/22.\par \par Fatigue-not a new c/o, previously updated labs\par

## 2023-06-15 NOTE — ASSESSMENT
[FreeTextEntry1] : Health Maintainence- complete exam including JONG and labs done 1/8/18.  EKG NL.  PPD placed right forearm in 1/2018 negative.  M11q was filled out.\par \par BPH-Cialis(Brand) 5 mg RENEWED, Pt needs a non-formulary exception(BPH, failed other meds, Avodart, Flomax, Doxazosin(before me),  Terazosin(before me)Uroxatrol).  Did see Urology Dr Berger.[facility] Rx Wal[employer]'s MemberID [id number], RxBin [id number], RxPCN [id number], RxGrp PDPLCE1, Providers [phone].  Only 5mg for BPH, not ED.\par \par HTN- will follow BP, is losing weight. + wt gain.  Gained wt.\par \par Prediabetes- pt has lost weight, exercising.  Refuses flu shot 12/10/18 BECAUSE HIS SISTER TOLD HIM IT WAS DANGEROUS.  Losing weight nicely. Recent slight gain. Labs were drawn today in Office. \par \par Schizoaffective disorder- Pt disagrees with this diagnosis. Pt says he has insomnia and anx/depr.   Dr Borden.  It is always draining to see him, he repeats the same issue over and over. \par \par CANCER SCREENING-never had colonoscopy, he agrees to it, will order. Pt did not have it yet. JONG and PSA done 1/8/18, colonoscopy discussed again 12/10/18!!.  4/9/19. 5/28/20 PREVIOUSLY GIVEN A GI REFERRAL.  PSA 5/2020, 6/1/21, 6/21/22..  Pt agrees to colonoscopy. Done at [place] 2022.\par \par DJD- knees, 2 prior surgeries. Also has back pain.  Advised to lose weight. Having trouble with ADLs, says he needs help with cleaning and shopping so M11Q filled out. UPDATED NEW ONE 3/14/22.\par \par Fatigue-not a new c/o, previously updated labs\par

## 2023-06-15 NOTE — PHYSICAL EXAM
[General Appearance - Well Developed] : well developed [Normal Appearance] : normal appearance [Well Groomed] : well groomed [General Appearance - Well Nourished] : well nourished [No Deformities] : no deformities [General Appearance - In No Acute Distress] : no acute distress [Normal Conjunctiva] : the conjunctiva exhibited no abnormalities [Eyelids - No Xanthelasma] : the eyelids demonstrated no xanthelasmas [Respiration, Rhythm And Depth] : normal respiratory rhythm and effort [Exaggerated Use Of Accessory Muscles For Inspiration] : no accessory muscle use [Heart Rate And Rhythm] : heart rate and rhythm were normal [Auscultation Breath Sounds / Voice Sounds] : lungs were clear to auscultation bilaterally [Heart Sounds] : normal S1 and S2 [Murmurs] : no murmurs present [Abdomen Soft] : soft [Abdomen Tenderness] : non-tender [Abdomen Mass (___ Cm)] : no abdominal mass palpated [Abnormal Walk] : normal gait [Gait - Sufficient For Exercise Testing] : the gait was sufficient for exercise testing [Nail Clubbing] : no clubbing of the fingernails [Cyanosis, Localized] : no localized cyanosis [Petechial Hemorrhages (___cm)] : no petechial hemorrhages [Skin Color & Pigmentation] : normal skin color and pigmentation [] : no rash [No Venous Stasis] : no venous stasis [Skin Lesions] : no skin lesions [No Skin Ulcers] : no skin ulcer [No Xanthoma] : no  xanthoma was observed [Oriented To Time, Place, And Person] : oriented to person, place, and time [Mood] : the mood was normal [FreeTextEntry1] : pressured speech

## 2023-06-15 NOTE — REASON FOR VISIT
[Follow-Up - Clinic] : a clinic follow-up of [FreeTextEntry1] : PT here for f/u of multiple medical problems. h/o elevated LFTs, prediabetes, BPH, review of cancer screening, etc  He denies that he has Schizoaffective disorder. \par Pt takes Cialis, has tried Tadafinil but results were not as good.  Has BPH and ED from meds.  Saw a Urologist @ [facility] and [facility], as per pt he was told to go to 10mg.  Pt reports mild incontinence, some nocturia.\par \par \par EKG: NSR, normal axis and intervals, no ST-Tw abnormalities.19\par \par Refuses flu shot, pt here 19 insisting I call his plan to get Cialis 5mg, i did this but was disconnected after 10min. \par 19 Dx with major depression by Dr Borden\par 20 MEDS REVIEWED,  BP ACCEPTABLE, ALL LABS TO BE REPEATED.  REMAINS ANGRY ABOUT PRIOR UROLOGIST, LIVING SITUATION AND ROOMMATE\par 20 His roommate  of an sepsis, ? OD. Upset\par 21 Doing well, had Moderna vaccine.\par 21 Roommate , has a new roommate aged 25, a heavy pot smoker.  He gets angry describing this to me.  He reviewed getting Cialis again for . It also induces a lot of stress.\par 3/14/22 Having more knee pain.  Needs Ortho, needs Colonoscopy\par 22 Has a new address.  Much better situation. Questioning his Schizoaffective dx.\par 12/15/22 Had his colonoscopy, + hemorrhoids.

## 2023-06-16 LAB
ALBUMIN SERPL ELPH-MCNC: 4.8 G/DL
ALP BLD-CCNC: 58 U/L
ALT SERPL-CCNC: 36 U/L
ANION GAP SERPL CALC-SCNC: 15 MMOL/L
AST SERPL-CCNC: 28 U/L
BILIRUB SERPL-MCNC: 0.4 MG/DL
BUN SERPL-MCNC: 11 MG/DL
CALCIUM SERPL-MCNC: 10 MG/DL
CHLORIDE SERPL-SCNC: 102 MMOL/L
CHOLEST SERPL-MCNC: 184 MG/DL
CO2 SERPL-SCNC: 22 MMOL/L
CREAT SERPL-MCNC: 0.77 MG/DL
EGFR: 99 ML/MIN/1.73M2
ESTIMATED AVERAGE GLUCOSE: 140 MG/DL
GLUCOSE SERPL-MCNC: 119 MG/DL
HBA1C MFR BLD HPLC: 6.5 %
HDLC SERPL-MCNC: 44 MG/DL
LDLC SERPL CALC-MCNC: 112 MG/DL
NONHDLC SERPL-MCNC: 140 MG/DL
POTASSIUM SERPL-SCNC: 4.8 MMOL/L
PROT SERPL-MCNC: 7.2 G/DL
SODIUM SERPL-SCNC: 139 MMOL/L
TRIGL SERPL-MCNC: 144 MG/DL
VALPROATE SERPL-MCNC: 69 UG/ML

## 2023-12-14 ENCOUNTER — APPOINTMENT (OUTPATIENT)
Dept: HEART AND VASCULAR | Facility: CLINIC | Age: 68
End: 2023-12-14
Payer: MEDICARE

## 2023-12-14 VITALS
DIASTOLIC BLOOD PRESSURE: 88 MMHG | HEIGHT: 75 IN | HEART RATE: 95 BPM | SYSTOLIC BLOOD PRESSURE: 142 MMHG | TEMPERATURE: 99.1 F | OXYGEN SATURATION: 98 % | WEIGHT: 242 LBS | BODY MASS INDEX: 30.09 KG/M2

## 2023-12-14 DIAGNOSIS — N52.9 MALE ERECTILE DYSFUNCTION, UNSPECIFIED: ICD-10-CM

## 2023-12-14 PROCEDURE — 99214 OFFICE O/P EST MOD 30 MIN: CPT

## 2023-12-14 PROCEDURE — 36415 COLL VENOUS BLD VENIPUNCTURE: CPT

## 2023-12-14 RX ORDER — TRAZODONE HYDROCHLORIDE 100 MG/1
100 TABLET ORAL
Refills: 0 | Status: ACTIVE | COMMUNITY

## 2023-12-14 NOTE — ASSESSMENT
[FreeTextEntry1] : Health Maintainence- complete exam including JONG and labs done 1/8/18. EKG NL. PPD placed right forearm in 1/2018 negative. M11q was filled out.   BPH-Cialis(Brand) 5 mg RENEWED, Pt needs a non-formulary exception(BPH, failed other meds, Avodart, Flomax, Doxazosin(before me), Terazosin(before me)Uroxatrol). Did see Urology Dr Berger.[facility] Rx [employer] MemberID [id number], RxBin [id number], RxPCN [id number], RxGrp [id number], Providers [phone]. Only 5mg for BPH, not ED.   HTN- will follow BP, is losing weight. + wt gain. Gained wt.   Prediabetes- pt has lost weight, exercising. Refuses flu shot 12/10/18 BECAUSE HIS SISTER TOLD HIM IT WAS DANGEROUS. Losing weight nicely. Recent slight gain. Labs were drawn today in Office.   Schizoaffective disorder- Pt disagrees with this diagnosis. Pt says he has insomnia and anx/depr. Dr Borden. It is always draining to see him, he repeats the same issue over and over.   CANCER SCREENING-never had colonoscopy, he agrees to it, will order. Pt did not have it yet. JONG and PSA done 1/8/18, colonoscopy discussed again 12/10/18!!. 4/9/19. 5/28/20 PREVIOUSLY GIVEN A GI REFERRAL. PSA 5/2020, 6/1/21, 6/21/22.. Pt agrees to colonoscopy. Done at [place] 2022.   DJD- knees, 2 prior surgeries. Also has back pain. Advised to lose weight. Having trouble with ADLs, says he needs help with cleaning and shopping so M11Q filled out. UPDATED NEW ONE 3/14/22.   Fatigue-not a new c/o, previously updated labs

## 2023-12-14 NOTE — REASON FOR VISIT
[Follow-Up - Clinic] : a clinic follow-up of [FreeTextEntry1] : PT here for f/u of multiple medical problems. h/o elevated LFTs, prediabetes, BPH, review of cancer screening, etc  He denies that he has Schizoaffective disorder.  Pt takes Cialis, has tried Tadafinil but results were not as good.  Has BPH and ED from meds.  Saw a Urologist @ [facility] and [facility], as per pt he was told to go to 10mg.  Pt reports mild incontinence, some nocturia.   EKG: NSR, normal axis and intervals, no ST-Tw abnormalities.19  Refuses flu shot, pt here 19 insisting I call his plan to get Cialis 5mg, i did this but was disconnected after 10min.  19 Dx with major depression by Dr Borden 20 MEDS REVIEWED,  BP ACCEPTABLE, ALL LABS TO BE REPEATED.  REMAINS ANGRY ABOUT PRIOR UROLOGIST, LIVING SITUATION AND ROOMMATE 20 His roommate  of an sepsis, ? OD. Upset 21 Doing well, had Moderna vaccine. 21 Roommate , has a new roommate aged 25, a heavy pot smoker.  He gets angry describing this to me.  He reviewed getting Cialis again for . It also induces a lot of stress. 3/14/22 Having more knee pain.  Needs Ortho, needs Colonoscopy 22 Has a new address.  Much better situation. Questioning his Schizoaffective dx. 12/15/22 Had his colonoscopy, + hemorrhoids. 6/15/23 No new c/o 23 Has a "cold". Coughing

## 2023-12-14 NOTE — HISTORY OF PRESENT ILLNESS
[FreeTextEntry1] : Psych- Dr Borden\par  Pod- Dr Gleason\par  -\par  Optometry- [facility] [address]

## 2023-12-18 LAB
ALBUMIN SERPL ELPH-MCNC: 4.9 G/DL
ALP BLD-CCNC: 67 U/L
ALT SERPL-CCNC: 33 U/L
ANION GAP SERPL CALC-SCNC: 17 MMOL/L
AST SERPL-CCNC: 30 U/L
BASOPHILS # BLD AUTO: 0.04 K/UL
BASOPHILS NFR BLD AUTO: 0.8 %
BILIRUB SERPL-MCNC: 0.4 MG/DL
BUN SERPL-MCNC: 14 MG/DL
CALCIUM SERPL-MCNC: 9.8 MG/DL
CHLORIDE SERPL-SCNC: 98 MMOL/L
CHOLEST SERPL-MCNC: 168 MG/DL
CO2 SERPL-SCNC: 21 MMOL/L
CREAT SERPL-MCNC: 0.81 MG/DL
EGFR: 97 ML/MIN/1.73M2
EOSINOPHIL # BLD AUTO: 0.04 K/UL
EOSINOPHIL NFR BLD AUTO: 0.8 %
ESTIMATED AVERAGE GLUCOSE: 131 MG/DL
GLUCOSE SERPL-MCNC: 115 MG/DL
HBA1C MFR BLD HPLC: 6.2 %
HCT VFR BLD CALC: 45.2 %
HDLC SERPL-MCNC: 39 MG/DL
HGB BLD-MCNC: 15.7 G/DL
IMM GRANULOCYTES NFR BLD AUTO: 0.4 %
LDLC SERPL CALC-MCNC: 105 MG/DL
LYMPHOCYTES # BLD AUTO: 0.99 K/UL
LYMPHOCYTES NFR BLD AUTO: 19.8 %
MAN DIFF?: NORMAL
MCHC RBC-ENTMCNC: 32.4 PG
MCHC RBC-ENTMCNC: 34.7 GM/DL
MCV RBC AUTO: 93.2 FL
MONOCYTES # BLD AUTO: 0.8 K/UL
MONOCYTES NFR BLD AUTO: 16 %
NEUTROPHILS # BLD AUTO: 3.11 K/UL
NEUTROPHILS NFR BLD AUTO: 62.2 %
NONHDLC SERPL-MCNC: 129 MG/DL
PLATELET # BLD AUTO: 186 K/UL
POTASSIUM SERPL-SCNC: 4.5 MMOL/L
PROT SERPL-MCNC: 7.2 G/DL
PSA SERPL-MCNC: 0.98 NG/ML
RBC # BLD: 4.85 M/UL
RBC # FLD: 12.1 %
SODIUM SERPL-SCNC: 136 MMOL/L
TRIGL SERPL-MCNC: 137 MG/DL
VALPROATE SERPL-MCNC: 61 UG/ML
WBC # FLD AUTO: 5 K/UL

## 2024-06-11 ENCOUNTER — APPOINTMENT (OUTPATIENT)
Dept: HEART AND VASCULAR | Facility: CLINIC | Age: 69
End: 2024-06-11
Payer: MEDICARE

## 2024-06-11 ENCOUNTER — NON-APPOINTMENT (OUTPATIENT)
Age: 69
End: 2024-06-11

## 2024-06-11 VITALS
OXYGEN SATURATION: 98 % | DIASTOLIC BLOOD PRESSURE: 80 MMHG | WEIGHT: 249 LBS | BODY MASS INDEX: 30.96 KG/M2 | TEMPERATURE: 98.4 F | HEART RATE: 88 BPM | HEIGHT: 75 IN | SYSTOLIC BLOOD PRESSURE: 130 MMHG

## 2024-06-11 DIAGNOSIS — F41.9 ANXIETY DISORDER, UNSPECIFIED: ICD-10-CM

## 2024-06-11 DIAGNOSIS — F32.A ANXIETY DISORDER, UNSPECIFIED: ICD-10-CM

## 2024-06-11 DIAGNOSIS — R73.03 PREDIABETES.: ICD-10-CM

## 2024-06-11 DIAGNOSIS — R09.89 OTHER SPECIFIED SYMPTOMS AND SIGNS INVOLVING THE CIRCULATORY AND RESPIRATORY SYSTEMS: ICD-10-CM

## 2024-06-11 PROCEDURE — 36415 COLL VENOUS BLD VENIPUNCTURE: CPT

## 2024-06-11 PROCEDURE — G2211 COMPLEX E/M VISIT ADD ON: CPT

## 2024-06-11 PROCEDURE — 93000 ELECTROCARDIOGRAM COMPLETE: CPT

## 2024-06-11 PROCEDURE — 99214 OFFICE O/P EST MOD 30 MIN: CPT

## 2024-06-11 RX ORDER — TADALAFIL 5 MG/1
5 TABLET, FILM COATED ORAL
Qty: 30 | Refills: 5 | Status: ACTIVE | COMMUNITY
Start: 1900-01-01 | End: 1900-01-01

## 2024-06-11 NOTE — HISTORY OF PRESENT ILLNESS
Problem: Patient Education: Go to Patient Education Activity  Goal: Patient/Family Education  Outcome: Progressing Towards Goal  Reviewed Lovenox injection with patient. [FreeTextEntry1] : Psych- Dr Kuldip Borden Pod- Dr Rosibel SUMMERS- Optometry- [facility] [address]

## 2024-06-11 NOTE — ASSESSMENT
[FreeTextEntry1] : Health Maintenance- complete exam including JONG and labs done 1/8/18. EKG NL. PPD placed right forearm in 1/2018 negative. M11q was filled out 3/14/22.  BPH-Cialis(Brand) 5 mg RENEWED, Pt needs a non-formulary exception(BPH, failed other meds, Avodart, Flomax, Doxazosin(before me), Terazosin(before me)Uroxatrol). Did see Urology Dr Berger.[facility] Rx [employer] MemberID [id number], RxBin [id number], RxPCN [id number], RxGrp [id number], Providers [phone]. Only 5mg for BPH, not ED.  HTN- will follow BP, is losing weight. + wt gain. Gained wt.  Prediabetes- pt has lost weight, exercising. Refuses flu shot 12/10/18 BECAUSE HIS SISTER TOLD HIM IT WAS DANGEROUS. Losing weight nicely. Recent slight gain. Labs were drawn today in Office.  Schizoaffective disorder- Pt disagrees with this diagnosis. Pt says he has insomnia and anx/depr. Dr Borden. It is always draining to see him, he repeats the same issue over and over.  CANCER SCREENING-never had colonoscopy, he agrees to it, will order. Pt did not have it yet. JONG and PSA done 1/8/18, colonoscopy discussed again 12/10/18!!. 4/9/19. 5/28/20 PREVIOUSLY GIVEN A GI REFERRAL. PSA 5/2020, 6/1/21, 6/21/22.. Pt agrees to colonoscopy. Done at [place] 2022.  DJD- knees, 2 prior surgeries. Also has back pain. Advised to lose weight. Having trouble with ADLs, says he needs help with cleaning and shopping so M11Q filled out. UPDATED NEW ONE 3/14/22.  Fatigue-not a new c/o, previously updated labs

## 2024-06-11 NOTE — REASON FOR VISIT
[Follow-Up - Clinic] : a clinic follow-up of [FreeTextEntry1] : PT here for f/u of multiple medical problems. h/o elevated LFTs, prediabetes, BPH, review of cancer screening, etc  He denies that he has Schizoaffective disorder.  Pt takes Cialis, has tried Tadafinil but results were not as good.  Has BPH and ED from meds.  Saw a Urologist @ [facility] and [facility], as per pt he was told to go to 10mg.  Pt reports mild incontinence, some nocturia.   EKG: NSR, normal axis and intervals, no ST-Tw abnormalities.19  Refuses flu shot, pt here 19 insisting I call his plan to get Cialis 5mg, i did this but was disconnected after 10min.  19 Dx with major depression by Dr Borden 20 MEDS REVIEWED,  BP ACCEPTABLE, ALL LABS TO BE REPEATED.  REMAINS ANGRY ABOUT PRIOR UROLOGIST, LIVING SITUATION AND ROOMMATE 20 His roommate  of an sepsis, ? OD. Upset 21 Doing well, had Moderna vaccine. 21 Roommate , has a new roommate aged 25, a heavy pot smoker.  He gets angry describing this to me.  He reviewed getting Cialis again for . It also induces a lot of stress. 3/14/22 Having more knee pain.  Needs Ortho, needs Colonoscopy 22 Has a new address.  Much better situation. Questioning his Schizoaffective dx. 12/15/22 Had his colonoscopy, + hemorrhoids. 6/15/23 No new c/o 23 Has a "cold". Coughing 24  Doing well, gained some weight back

## 2024-06-12 ENCOUNTER — NON-APPOINTMENT (OUTPATIENT)
Age: 69
End: 2024-06-12

## 2024-06-12 LAB
ALBUMIN SERPL ELPH-MCNC: 4.6 G/DL
ALP BLD-CCNC: 57 U/L
ALT SERPL-CCNC: 27 U/L
ANION GAP SERPL CALC-SCNC: 16 MMOL/L
APPEARANCE: CLEAR
AST SERPL-CCNC: 27 U/L
BACTERIA: NEGATIVE /HPF
BASOPHILS # BLD AUTO: 0.05 K/UL
BASOPHILS NFR BLD AUTO: 1 %
BILIRUB SERPL-MCNC: 0.4 MG/DL
BILIRUBIN URINE: NEGATIVE
BLOOD URINE: NEGATIVE
BUN SERPL-MCNC: 15 MG/DL
CALCIUM SERPL-MCNC: 9.4 MG/DL
CAST: 0 /LPF
CHLORIDE SERPL-SCNC: 102 MMOL/L
CO2 SERPL-SCNC: 20 MMOL/L
COLOR: YELLOW
CREAT SERPL-MCNC: 0.71 MG/DL
EGFR: 101 ML/MIN/1.73M2
EOSINOPHIL # BLD AUTO: 0.13 K/UL
EOSINOPHIL NFR BLD AUTO: 2.5 %
EPITHELIAL CELLS: 0 /HPF
ESTIMATED AVERAGE GLUCOSE: 131 MG/DL
GLUCOSE QUALITATIVE U: NEGATIVE MG/DL
GLUCOSE SERPL-MCNC: 117 MG/DL
HBA1C MFR BLD HPLC: 6.2 %
HCT VFR BLD CALC: 47 %
HGB BLD-MCNC: 15.8 G/DL
IMM GRANULOCYTES NFR BLD AUTO: 0.4 %
KETONES URINE: NEGATIVE MG/DL
LEUKOCYTE ESTERASE URINE: NEGATIVE
LYMPHOCYTES # BLD AUTO: 1.9 K/UL
LYMPHOCYTES NFR BLD AUTO: 37.3 %
MAN DIFF?: NORMAL
MCHC RBC-ENTMCNC: 32.2 PG
MCHC RBC-ENTMCNC: 33.6 GM/DL
MCV RBC AUTO: 95.7 FL
MICROSCOPIC-UA: NORMAL
MONOCYTES # BLD AUTO: 0.57 K/UL
MONOCYTES NFR BLD AUTO: 11.2 %
NEUTROPHILS # BLD AUTO: 2.43 K/UL
NEUTROPHILS NFR BLD AUTO: 47.6 %
NITRITE URINE: NEGATIVE
PH URINE: 6.5
PLATELET # BLD AUTO: 189 K/UL
POTASSIUM SERPL-SCNC: 4.6 MMOL/L
PROT SERPL-MCNC: 7.2 G/DL
PROTEIN URINE: NEGATIVE MG/DL
RBC # BLD: 4.91 M/UL
RBC # FLD: 12.7 %
RED BLOOD CELLS URINE: 1 /HPF
SODIUM SERPL-SCNC: 138 MMOL/L
SPECIFIC GRAVITY URINE: 1.01
T3 SERPL-MCNC: 72 NG/DL
T4 SERPL-MCNC: 4.1 UG/DL
TSH SERPL-ACNC: 1.87 UIU/ML
UROBILINOGEN URINE: 0.2 MG/DL
VALPROATE SERPL-MCNC: 63 UG/ML
WBC # FLD AUTO: 5.1 K/UL
WHITE BLOOD CELLS URINE: 0 /HPF

## 2024-12-10 ENCOUNTER — NON-APPOINTMENT (OUTPATIENT)
Age: 69
End: 2024-12-10

## 2024-12-10 ENCOUNTER — APPOINTMENT (OUTPATIENT)
Dept: HEART AND VASCULAR | Facility: CLINIC | Age: 69
End: 2024-12-10
Payer: MEDICARE

## 2024-12-10 VITALS
HEIGHT: 75 IN | HEART RATE: 98 BPM | WEIGHT: 247 LBS | DIASTOLIC BLOOD PRESSURE: 94 MMHG | OXYGEN SATURATION: 100 % | SYSTOLIC BLOOD PRESSURE: 140 MMHG | TEMPERATURE: 97.5 F | BODY MASS INDEX: 30.71 KG/M2

## 2024-12-10 DIAGNOSIS — N52.9 MALE ERECTILE DYSFUNCTION, UNSPECIFIED: ICD-10-CM

## 2024-12-10 DIAGNOSIS — N40.0 BENIGN PROSTATIC HYPERPLASIA WITHOUT LOWER URINARY TRACT SYMPMS: ICD-10-CM

## 2024-12-10 DIAGNOSIS — R09.89 OTHER SPECIFIED SYMPTOMS AND SIGNS INVOLVING THE CIRCULATORY AND RESPIRATORY SYSTEMS: ICD-10-CM

## 2024-12-10 DIAGNOSIS — R73.03 PREDIABETES.: ICD-10-CM

## 2024-12-10 PROCEDURE — 36415 COLL VENOUS BLD VENIPUNCTURE: CPT

## 2024-12-10 PROCEDURE — 99214 OFFICE O/P EST MOD 30 MIN: CPT

## 2024-12-11 LAB
ALBUMIN SERPL ELPH-MCNC: 4.8 G/DL
ALP BLD-CCNC: 57 U/L
ALT SERPL-CCNC: 26 U/L
ANION GAP SERPL CALC-SCNC: 15 MMOL/L
AST SERPL-CCNC: 25 U/L
BASOPHILS # BLD AUTO: 0.04 K/UL
BASOPHILS NFR BLD AUTO: 0.7 %
BILIRUB SERPL-MCNC: 0.2 MG/DL
BUN SERPL-MCNC: 14 MG/DL
CALCIUM SERPL-MCNC: 9.7 MG/DL
CHLORIDE SERPL-SCNC: 99 MMOL/L
CHOLEST SERPL-MCNC: 166 MG/DL
CO2 SERPL-SCNC: 23 MMOL/L
CREAT SERPL-MCNC: 0.79 MG/DL
EGFR: 97 ML/MIN/1.73M2
EOSINOPHIL # BLD AUTO: 0.08 K/UL
EOSINOPHIL NFR BLD AUTO: 1.4 %
ESTIMATED AVERAGE GLUCOSE: 126 MG/DL
GLUCOSE SERPL-MCNC: 141 MG/DL
HBA1C MFR BLD HPLC: 6 %
HCT VFR BLD CALC: 47.3 %
HDLC SERPL-MCNC: 39 MG/DL
HGB BLD-MCNC: 15.4 G/DL
IMM GRANULOCYTES NFR BLD AUTO: 0.3 %
LDLC SERPL CALC-MCNC: 94 MG/DL
LYMPHOCYTES # BLD AUTO: 2.34 K/UL
LYMPHOCYTES NFR BLD AUTO: 40 %
MAN DIFF?: NORMAL
MCHC RBC-ENTMCNC: 32 PG
MCHC RBC-ENTMCNC: 32.6 G/DL
MCV RBC AUTO: 98.3 FL
MONOCYTES # BLD AUTO: 0.64 K/UL
MONOCYTES NFR BLD AUTO: 10.9 %
NEUTROPHILS # BLD AUTO: 2.73 K/UL
NEUTROPHILS NFR BLD AUTO: 46.7 %
NONHDLC SERPL-MCNC: 127 MG/DL
PLATELET # BLD AUTO: 199 K/UL
POTASSIUM SERPL-SCNC: 4.6 MMOL/L
PROT SERPL-MCNC: 7.1 G/DL
RBC # BLD: 4.81 M/UL
RBC # FLD: 12.6 %
SODIUM SERPL-SCNC: 137 MMOL/L
TRIGL SERPL-MCNC: 187 MG/DL
VALPROATE SERPL-MCNC: 61 UG/ML
WBC # FLD AUTO: 5.85 K/UL

## 2025-06-24 ENCOUNTER — APPOINTMENT (OUTPATIENT)
Dept: HEART AND VASCULAR | Facility: CLINIC | Age: 70
End: 2025-06-24
Payer: MEDICARE

## 2025-06-24 VITALS
HEART RATE: 107 BPM | TEMPERATURE: 98 F | DIASTOLIC BLOOD PRESSURE: 90 MMHG | WEIGHT: 241 LBS | SYSTOLIC BLOOD PRESSURE: 158 MMHG | OXYGEN SATURATION: 96 % | HEIGHT: 75 IN | BODY MASS INDEX: 29.97 KG/M2

## 2025-06-24 PROCEDURE — 99214 OFFICE O/P EST MOD 30 MIN: CPT

## 2025-06-24 PROCEDURE — 93000 ELECTROCARDIOGRAM COMPLETE: CPT

## 2025-06-24 PROCEDURE — 36415 COLL VENOUS BLD VENIPUNCTURE: CPT

## 2025-06-26 LAB
ALBUMIN SERPL ELPH-MCNC: 5.2 G/DL
ALP BLD-CCNC: 61 U/L
ALT SERPL-CCNC: 45 U/L
ANION GAP SERPL CALC-SCNC: 17 MMOL/L
APPEARANCE: CLEAR
AST SERPL-CCNC: 45 U/L
BACTERIA: NEGATIVE /HPF
BASOPHILS # BLD AUTO: 0.05 K/UL
BASOPHILS NFR BLD AUTO: 0.8 %
BILIRUB SERPL-MCNC: 0.4 MG/DL
BILIRUBIN URINE: NEGATIVE
BLOOD URINE: NEGATIVE
BUN SERPL-MCNC: 16 MG/DL
CALCIUM SERPL-MCNC: 10.3 MG/DL
CAST: 0 /LPF
CHLORIDE SERPL-SCNC: 100 MMOL/L
CHOLEST SERPL-MCNC: 210 MG/DL
CO2 SERPL-SCNC: 23 MMOL/L
COLOR: YELLOW
CREAT SERPL-MCNC: 0.89 MG/DL
EGFRCR SERPLBLD CKD-EPI 2021: 93 ML/MIN/1.73M2
EOSINOPHIL # BLD AUTO: 0.1 K/UL
EOSINOPHIL NFR BLD AUTO: 1.7 %
EPITHELIAL CELLS: 0 /HPF
ESTIMATED AVERAGE GLUCOSE: 131 MG/DL
GLUCOSE QUALITATIVE U: NEGATIVE MG/DL
GLUCOSE SERPL-MCNC: 124 MG/DL
HBA1C MFR BLD HPLC: 6.2 %
HCT VFR BLD CALC: 50.5 %
HDLC SERPL-MCNC: 46 MG/DL
HGB BLD-MCNC: 16.1 G/DL
IMM GRANULOCYTES NFR BLD AUTO: 0.2 %
KETONES URINE: NEGATIVE MG/DL
LDLC SERPL-MCNC: 128 MG/DL
LEUKOCYTE ESTERASE URINE: NEGATIVE
LYMPHOCYTES # BLD AUTO: 2.18 K/UL
LYMPHOCYTES NFR BLD AUTO: 36.3 %
MAN DIFF?: NORMAL
MCHC RBC-ENTMCNC: 31.9 G/DL
MCHC RBC-ENTMCNC: 32.4 PG
MCV RBC AUTO: 101.6 FL
MICROSCOPIC-UA: NORMAL
MONOCYTES # BLD AUTO: 0.57 K/UL
MONOCYTES NFR BLD AUTO: 9.5 %
NEUTROPHILS # BLD AUTO: 3.09 K/UL
NEUTROPHILS NFR BLD AUTO: 51.5 %
NITRITE URINE: NEGATIVE
NONHDLC SERPL-MCNC: 164 MG/DL
PH URINE: 6
PLATELET # BLD AUTO: 189 K/UL
POTASSIUM SERPL-SCNC: 4.8 MMOL/L
PROT SERPL-MCNC: 7.7 G/DL
PROTEIN URINE: NEGATIVE MG/DL
PSA SERPL-MCNC: 1.29 NG/ML
RBC # BLD: 4.97 M/UL
RBC # FLD: 13.3 %
RED BLOOD CELLS URINE: 1 /HPF
SODIUM SERPL-SCNC: 139 MMOL/L
SPECIFIC GRAVITY URINE: 1.01
T3 SERPL-MCNC: 85 NG/DL
T4 SERPL-MCNC: 4.7 UG/DL
TRIGL SERPL-MCNC: 203 MG/DL
TSH SERPL-ACNC: 2.09 UIU/ML
UROBILINOGEN URINE: 0.2 MG/DL
VALPROATE SERPL-MCNC: 65 UG/ML
WBC # FLD AUTO: 6 K/UL
WHITE BLOOD CELLS URINE: 0 /HPF